# Patient Record
Sex: MALE | Race: WHITE | Employment: FULL TIME | URBAN - METROPOLITAN AREA
[De-identification: names, ages, dates, MRNs, and addresses within clinical notes are randomized per-mention and may not be internally consistent; named-entity substitution may affect disease eponyms.]

---

## 2017-01-16 ENCOUNTER — HOSPITAL ENCOUNTER (OUTPATIENT)
Dept: PREADMISSION TESTING | Age: 62
Discharge: HOME | End: 2017-01-16
Payer: COMMERCIAL

## 2017-01-16 VITALS
DIASTOLIC BLOOD PRESSURE: 92 MMHG | SYSTOLIC BLOOD PRESSURE: 149 MMHG | TEMPERATURE: 98.5 F | WEIGHT: 190 LBS | HEART RATE: 64 BPM | BODY MASS INDEX: 28.14 KG/M2 | HEIGHT: 69 IN

## 2017-01-16 LAB
ABO + RH BLD: NORMAL
APPEARANCE UR: CLEAR
ATRIAL RATE: 56 BPM
BACTERIA URNS QL MICRO: NEGATIVE /HPF
BILIRUB UR QL: NEGATIVE
BLOOD GROUP ANTIBODIES SERPL: NORMAL
CALCULATED P AXIS, ECG09: -11 DEGREES
CALCULATED R AXIS, ECG10: 64 DEGREES
CALCULATED T AXIS, ECG11: 11 DEGREES
COLOR UR: NORMAL
DIAGNOSIS, 93000: NORMAL
EPITH CASTS URNS QL MICRO: NORMAL /LPF
EST. AVERAGE GLUCOSE BLD GHB EST-MCNC: 128 MG/DL
GLUCOSE UR STRIP.AUTO-MCNC: NEGATIVE MG/DL
HBA1C MFR BLD: 6.1 % (ref 4.2–6.3)
HGB UR QL STRIP: NEGATIVE
HYALINE CASTS URNS QL MICRO: NORMAL /LPF (ref 0–5)
INR PPP: 1 (ref 0.9–1.1)
KETONES UR QL STRIP.AUTO: NEGATIVE MG/DL
LEUKOCYTE ESTERASE UR QL STRIP.AUTO: NEGATIVE
NITRITE UR QL STRIP.AUTO: NEGATIVE
P-R INTERVAL, ECG05: 150 MS
PH UR STRIP: 6 [PH] (ref 5–8)
PROT UR STRIP-MCNC: NEGATIVE MG/DL
PROTHROMBIN TIME: 9.9 SEC (ref 9–11.1)
Q-T INTERVAL, ECG07: 418 MS
QRS DURATION, ECG06: 102 MS
QTC CALCULATION (BEZET), ECG08: 403 MS
RBC #/AREA URNS HPF: NORMAL /HPF (ref 0–5)
SP GR UR REFRACTOMETRY: 1.02 (ref 1–1.03)
SPECIMEN EXP DATE BLD: NORMAL
UA: UC IF INDICATED,UAUC: NORMAL
UROBILINOGEN UR QL STRIP.AUTO: 0.2 EU/DL (ref 0.2–1)
VENTRICULAR RATE, ECG03: 56 BPM
WBC URNS QL MICRO: NORMAL /HPF (ref 0–4)

## 2017-01-16 PROCEDURE — 86900 BLOOD TYPING SEROLOGIC ABO: CPT | Performed by: ORTHOPAEDIC SURGERY

## 2017-01-16 PROCEDURE — 81001 URINALYSIS AUTO W/SCOPE: CPT | Performed by: ORTHOPAEDIC SURGERY

## 2017-01-16 PROCEDURE — 85610 PROTHROMBIN TIME: CPT | Performed by: ORTHOPAEDIC SURGERY

## 2017-01-16 PROCEDURE — 36415 COLL VENOUS BLD VENIPUNCTURE: CPT | Performed by: ORTHOPAEDIC SURGERY

## 2017-01-16 PROCEDURE — 83036 HEMOGLOBIN GLYCOSYLATED A1C: CPT | Performed by: ORTHOPAEDIC SURGERY

## 2017-01-16 PROCEDURE — 93005 ELECTROCARDIOGRAM TRACING: CPT

## 2017-01-16 RX ORDER — BISMUTH SUBSALICYLATE 262 MG
1 TABLET,CHEWABLE ORAL DAILY
COMMUNITY

## 2017-01-16 RX ORDER — IBUPROFEN 200 MG
TABLET ORAL
COMMUNITY

## 2017-01-16 RX ORDER — ATORVASTATIN CALCIUM 20 MG/1
TABLET, FILM COATED ORAL
COMMUNITY

## 2017-01-16 NOTE — PERIOP NOTES
PREOPERATIVE INSTRUCTIONS REVIEWED WITH PATIENT. PATIENT GIVEN TWO--SIX PACKS OF CHG WIPES. INSTRUCTIONS REVIEWED ON USE OF CHG WIPES. PATIENT GIVEN SSI INFECTIONS SHEET; MRSA/MSSA TREATMENT INSTRUCTION SHEET GIVEN WITH AN EXPLANATION TO PATIENT THAT THEY WILL BE NOTIFIED IF TREATMENT INSTRUCTIONS NEED TO BE INITIATED. PATIENT WAS GIVEN THE OPPORTUNITY TO ASK QUESTIONS; REGARDING THE INFORMATION PROVIDED. PREOP DIET AND NUTRITION UPDATED GUIDELINES/ INSTRUCTIONS REVIEWED WITH PATIENT.   PATIENT GIVEN INSTRUCTIONS AND DEMONSTRATED UNDERSTANDING OF INCENTIVE SPIROMETRY USE.

## 2017-01-17 LAB
BACTERIA SPEC CULT: NORMAL
BACTERIA SPEC CULT: NORMAL
SERVICE CMNT-IMP: NORMAL

## 2017-02-05 ENCOUNTER — ANESTHESIA EVENT (OUTPATIENT)
Dept: SURGERY | Age: 62
DRG: 462 | End: 2017-02-05
Payer: COMMERCIAL

## 2017-02-05 PROBLEM — M17.9 DJD (DEGENERATIVE JOINT DISEASE) OF KNEE: Status: ACTIVE | Noted: 2017-02-05

## 2017-02-06 ENCOUNTER — ANESTHESIA (OUTPATIENT)
Dept: SURGERY | Age: 62
DRG: 462 | End: 2017-02-06
Payer: COMMERCIAL

## 2017-02-06 ENCOUNTER — HOSPITAL ENCOUNTER (INPATIENT)
Age: 62
LOS: 1 days | Discharge: HOME HEALTH | DRG: 462 | End: 2017-02-07
Attending: ORTHOPAEDIC SURGERY | Admitting: ORTHOPAEDIC SURGERY
Payer: COMMERCIAL

## 2017-02-06 LAB
ABO + RH BLD: NORMAL
BLOOD GROUP ANTIBODIES SERPL: NORMAL
GLUCOSE BLD STRIP.AUTO-MCNC: 97 MG/DL (ref 65–100)
SERVICE CMNT-IMP: NORMAL
SPECIMEN EXP DATE BLD: NORMAL

## 2017-02-06 PROCEDURE — 77030020788: Performed by: ORTHOPAEDIC SURGERY

## 2017-02-06 PROCEDURE — 77030006784 HC BLD SAW OSC MCRA -B: Performed by: ORTHOPAEDIC SURGERY

## 2017-02-06 PROCEDURE — 77030028224 HC PDNG CST BSNM -A: Performed by: ORTHOPAEDIC SURGERY

## 2017-02-06 PROCEDURE — 0SRC0L9 REPLACEMENT OF RIGHT KNEE JOINT WITH MEDIAL UNICONDYLAR SYNTHETIC SUBSTITUTE, CEMENTED, OPEN APPROACH: ICD-10-PCS | Performed by: ORTHOPAEDIC SURGERY

## 2017-02-06 PROCEDURE — C1776 JOINT DEVICE (IMPLANTABLE): HCPCS | Performed by: ORTHOPAEDIC SURGERY

## 2017-02-06 PROCEDURE — 77030006813 HC BLD SAW RECIP STRY -C: Performed by: ORTHOPAEDIC SURGERY

## 2017-02-06 PROCEDURE — 74011250636 HC RX REV CODE- 250/636: Performed by: PHYSICIAN ASSISTANT

## 2017-02-06 PROCEDURE — 77030034850: Performed by: ORTHOPAEDIC SURGERY

## 2017-02-06 PROCEDURE — 76060000038 HC ANESTHESIA 3.5 TO 4 HR: Performed by: ORTHOPAEDIC SURGERY

## 2017-02-06 PROCEDURE — 77030006822 HC BLD SAW SAG BRSM -B: Performed by: ORTHOPAEDIC SURGERY

## 2017-02-06 PROCEDURE — 82962 GLUCOSE BLOOD TEST: CPT

## 2017-02-06 PROCEDURE — 77030026763 HC BN CEM J&J -E: Performed by: ORTHOPAEDIC SURGERY

## 2017-02-06 PROCEDURE — 74011250636 HC RX REV CODE- 250/636

## 2017-02-06 PROCEDURE — 77030018822 HC SLV COMPR FT COVD -B

## 2017-02-06 PROCEDURE — 77030018836 HC SOL IRR NACL ICUM -A: Performed by: ORTHOPAEDIC SURGERY

## 2017-02-06 PROCEDURE — 77030010783 HC BOWL MX BN CEM J&J -B: Performed by: ORTHOPAEDIC SURGERY

## 2017-02-06 PROCEDURE — 65270000029 HC RM PRIVATE

## 2017-02-06 PROCEDURE — 74011000250 HC RX REV CODE- 250

## 2017-02-06 PROCEDURE — 74011250636 HC RX REV CODE- 250/636: Performed by: ORTHOPAEDIC SURGERY

## 2017-02-06 PROCEDURE — 76210000017 HC OR PH I REC 1.5 TO 2 HR: Performed by: ORTHOPAEDIC SURGERY

## 2017-02-06 PROCEDURE — 36415 COLL VENOUS BLD VENIPUNCTURE: CPT | Performed by: ORTHOPAEDIC SURGERY

## 2017-02-06 PROCEDURE — 77030002933 HC SUT MCRYL J&J -A: Performed by: ORTHOPAEDIC SURGERY

## 2017-02-06 PROCEDURE — 74011250637 HC RX REV CODE- 250/637: Performed by: ANESTHESIOLOGY

## 2017-02-06 PROCEDURE — 74011000258 HC RX REV CODE- 258

## 2017-02-06 PROCEDURE — 74011000250 HC RX REV CODE- 250: Performed by: PHYSICIAN ASSISTANT

## 2017-02-06 PROCEDURE — 77030031139 HC SUT VCRL2 J&J -A: Performed by: ORTHOPAEDIC SURGERY

## 2017-02-06 PROCEDURE — 97161 PT EVAL LOW COMPLEX 20 MIN: CPT

## 2017-02-06 PROCEDURE — 74011000250 HC RX REV CODE- 250: Performed by: ORTHOPAEDIC SURGERY

## 2017-02-06 PROCEDURE — G8978 MOBILITY CURRENT STATUS: HCPCS

## 2017-02-06 PROCEDURE — 77030010788: Performed by: ORTHOPAEDIC SURGERY

## 2017-02-06 PROCEDURE — 74011250636 HC RX REV CODE- 250/636: Performed by: ANESTHESIOLOGY

## 2017-02-06 PROCEDURE — 74011000258 HC RX REV CODE- 258: Performed by: PHYSICIAN ASSISTANT

## 2017-02-06 PROCEDURE — G8979 MOBILITY GOAL STATUS: HCPCS

## 2017-02-06 PROCEDURE — 77030018673: Performed by: ORTHOPAEDIC SURGERY

## 2017-02-06 PROCEDURE — 86900 BLOOD TYPING SEROLOGIC ABO: CPT | Performed by: ORTHOPAEDIC SURGERY

## 2017-02-06 PROCEDURE — 74011250637 HC RX REV CODE- 250/637: Performed by: PHYSICIAN ASSISTANT

## 2017-02-06 PROCEDURE — 0SRD0L9 REPLACEMENT OF LEFT KNEE JOINT WITH MEDIAL UNICONDYLAR SYNTHETIC SUBSTITUTE, CEMENTED, OPEN APPROACH: ICD-10-PCS | Performed by: ORTHOPAEDIC SURGERY

## 2017-02-06 PROCEDURE — 77030011640 HC PAD GRND REM COVD -A: Performed by: ORTHOPAEDIC SURGERY

## 2017-02-06 PROCEDURE — 77030007866 HC KT SPN ANES BBMI -B: Performed by: ANESTHESIOLOGY

## 2017-02-06 PROCEDURE — 77030020753 HC CUF TRNQT 1BLA STRY -B: Performed by: ORTHOPAEDIC SURGERY

## 2017-02-06 PROCEDURE — 77030029372 HC ADH SKN CLSR PRINEO J&J -C: Performed by: ORTHOPAEDIC SURGERY

## 2017-02-06 PROCEDURE — C9290 INJ, BUPIVACAINE LIPOSOME: HCPCS | Performed by: ORTHOPAEDIC SURGERY

## 2017-02-06 PROCEDURE — 76010000134 HC OR TIME 3.5 TO 4 HR: Performed by: ORTHOPAEDIC SURGERY

## 2017-02-06 PROCEDURE — C9290 INJ, BUPIVACAINE LIPOSOME: HCPCS | Performed by: PHYSICIAN ASSISTANT

## 2017-02-06 PROCEDURE — 97116 GAIT TRAINING THERAPY: CPT

## 2017-02-06 PROCEDURE — 77030006783 HC BLD SAW OSC MCRA -A: Performed by: ORTHOPAEDIC SURGERY

## 2017-02-06 DEVICE — FEMORAL SIZE E LT MEDIAL/RT LATERAL
Type: IMPLANTABLE DEVICE | Site: KNEE | Status: FUNCTIONAL
Brand: ZUK

## 2017-02-06 DEVICE — FEMORAL SIZE E RT MEDIAL/LT LATERAL
Type: IMPLANTABLE DEVICE | Site: KNEE | Status: FUNCTIONAL
Brand: ZUK

## 2017-02-06 DEVICE — ARTICULAR SURFACE SIZE 5 10MM
Type: IMPLANTABLE DEVICE | Site: KNEE | Status: FUNCTIONAL
Brand: ZUK

## 2017-02-06 DEVICE — CEMENT BNE 40GM FULL DOSE PMMA W/ GENT HI VISC RADPQ LNG: Type: IMPLANTABLE DEVICE | Site: KNEE | Status: FUNCTIONAL

## 2017-02-06 DEVICE — KNEE CEM FLX FEM/TIB UNIFLX -- K1 BSV-KY-SC K2-NY: Type: IMPLANTABLE DEVICE | Status: FUNCTIONAL

## 2017-02-06 DEVICE — TIBIA BASE SIZE 4 LEFT                                    MEDIAL/RIGHT LATERAL
Type: IMPLANTABLE DEVICE | Site: KNEE | Status: FUNCTIONAL
Brand: ZUK

## 2017-02-06 DEVICE — TIBIA BASE SIZE 5 RIGHT                                    MEDIAL/LEFT LATERAL
Type: IMPLANTABLE DEVICE | Site: KNEE | Status: FUNCTIONAL
Brand: ZUK

## 2017-02-06 DEVICE — ARTICULAR SURFACE SIZE 4 9MM
Type: IMPLANTABLE DEVICE | Site: KNEE | Status: FUNCTIONAL
Brand: ZUK

## 2017-02-06 RX ORDER — NALOXONE HYDROCHLORIDE 0.4 MG/ML
0.4 INJECTION, SOLUTION INTRAMUSCULAR; INTRAVENOUS; SUBCUTANEOUS AS NEEDED
Status: DISCONTINUED | OUTPATIENT
Start: 2017-02-06 | End: 2017-02-07 | Stop reason: HOSPADM

## 2017-02-06 RX ORDER — FENTANYL CITRATE 50 UG/ML
50 INJECTION, SOLUTION INTRAMUSCULAR; INTRAVENOUS AS NEEDED
Status: DISCONTINUED | OUTPATIENT
Start: 2017-02-06 | End: 2017-02-06 | Stop reason: HOSPADM

## 2017-02-06 RX ORDER — BUPIVACAINE HYDROCHLORIDE 7.5 MG/ML
INJECTION, SOLUTION EPIDURAL; RETROBULBAR AS NEEDED
Status: DISCONTINUED | OUTPATIENT
Start: 2017-02-06 | End: 2017-02-06 | Stop reason: HOSPADM

## 2017-02-06 RX ORDER — ACETAMINOPHEN 325 MG/1
650 TABLET ORAL EVERY 6 HOURS
Status: DISCONTINUED | OUTPATIENT
Start: 2017-02-06 | End: 2017-02-07 | Stop reason: HOSPADM

## 2017-02-06 RX ORDER — SODIUM CHLORIDE 0.9 % (FLUSH) 0.9 %
5-10 SYRINGE (ML) INJECTION AS NEEDED
Status: DISCONTINUED | OUTPATIENT
Start: 2017-02-06 | End: 2017-02-06 | Stop reason: SDUPTHER

## 2017-02-06 RX ORDER — DEXAMETHASONE SODIUM PHOSPHATE 100 MG/10ML
4-8 INJECTION INTRAMUSCULAR; INTRAVENOUS ONCE
Status: COMPLETED | OUTPATIENT
Start: 2017-02-06 | End: 2017-02-06

## 2017-02-06 RX ORDER — PROPOFOL 10 MG/ML
INJECTION, EMULSION INTRAVENOUS AS NEEDED
Status: DISCONTINUED | OUTPATIENT
Start: 2017-02-06 | End: 2017-02-06 | Stop reason: HOSPADM

## 2017-02-06 RX ORDER — MORPHINE SULFATE 2 MG/ML
2 INJECTION, SOLUTION INTRAMUSCULAR; INTRAVENOUS
Status: DISCONTINUED | OUTPATIENT
Start: 2017-02-06 | End: 2017-02-07 | Stop reason: HOSPADM

## 2017-02-06 RX ORDER — ATORVASTATIN CALCIUM 20 MG/1
20 TABLET, FILM COATED ORAL
Status: DISCONTINUED | OUTPATIENT
Start: 2017-02-06 | End: 2017-02-07 | Stop reason: HOSPADM

## 2017-02-06 RX ORDER — MIDAZOLAM HYDROCHLORIDE 1 MG/ML
1 INJECTION, SOLUTION INTRAMUSCULAR; INTRAVENOUS AS NEEDED
Status: DISCONTINUED | OUTPATIENT
Start: 2017-02-06 | End: 2017-02-06 | Stop reason: HOSPADM

## 2017-02-06 RX ORDER — KETOROLAC TROMETHAMINE 30 MG/ML
30 INJECTION, SOLUTION INTRAMUSCULAR; INTRAVENOUS EVERY 6 HOURS
Status: DISCONTINUED | OUTPATIENT
Start: 2017-02-06 | End: 2017-02-07 | Stop reason: HOSPADM

## 2017-02-06 RX ORDER — HYDROMORPHONE HYDROCHLORIDE 1 MG/ML
0.5 INJECTION, SOLUTION INTRAMUSCULAR; INTRAVENOUS; SUBCUTANEOUS
Status: DISCONTINUED | OUTPATIENT
Start: 2017-02-06 | End: 2017-02-06 | Stop reason: SDUPTHER

## 2017-02-06 RX ORDER — POLYETHYLENE GLYCOL 3350 17 G/17G
17 POWDER, FOR SOLUTION ORAL DAILY
Status: DISCONTINUED | OUTPATIENT
Start: 2017-02-07 | End: 2017-02-07 | Stop reason: HOSPADM

## 2017-02-06 RX ORDER — ROPIVACAINE HYDROCHLORIDE 5 MG/ML
150 INJECTION, SOLUTION EPIDURAL; INFILTRATION; PERINEURAL AS NEEDED
Status: DISCONTINUED | OUTPATIENT
Start: 2017-02-06 | End: 2017-02-06 | Stop reason: HOSPADM

## 2017-02-06 RX ORDER — HYDROMORPHONE HYDROCHLORIDE 1 MG/ML
0.2 INJECTION, SOLUTION INTRAMUSCULAR; INTRAVENOUS; SUBCUTANEOUS
Status: DISCONTINUED | OUTPATIENT
Start: 2017-02-06 | End: 2017-02-06 | Stop reason: HOSPADM

## 2017-02-06 RX ORDER — SODIUM CHLORIDE 9 MG/ML
25 INJECTION, SOLUTION INTRAVENOUS CONTINUOUS
Status: DISCONTINUED | OUTPATIENT
Start: 2017-02-06 | End: 2017-02-06 | Stop reason: HOSPADM

## 2017-02-06 RX ORDER — DIPHENHYDRAMINE HYDROCHLORIDE 50 MG/ML
12.5 INJECTION, SOLUTION INTRAMUSCULAR; INTRAVENOUS AS NEEDED
Status: DISCONTINUED | OUTPATIENT
Start: 2017-02-06 | End: 2017-02-06 | Stop reason: HOSPADM

## 2017-02-06 RX ORDER — SODIUM CHLORIDE 0.9 % (FLUSH) 0.9 %
5-10 SYRINGE (ML) INJECTION AS NEEDED
Status: DISCONTINUED | OUTPATIENT
Start: 2017-02-06 | End: 2017-02-06 | Stop reason: HOSPADM

## 2017-02-06 RX ORDER — MIDAZOLAM HYDROCHLORIDE 1 MG/ML
INJECTION, SOLUTION INTRAMUSCULAR; INTRAVENOUS AS NEEDED
Status: DISCONTINUED | OUTPATIENT
Start: 2017-02-06 | End: 2017-02-06 | Stop reason: HOSPADM

## 2017-02-06 RX ORDER — NALOXONE HYDROCHLORIDE 0.4 MG/ML
0.2 INJECTION, SOLUTION INTRAMUSCULAR; INTRAVENOUS; SUBCUTANEOUS AS NEEDED
Status: DISCONTINUED | OUTPATIENT
Start: 2017-02-06 | End: 2017-02-07 | Stop reason: HOSPADM

## 2017-02-06 RX ORDER — FENTANYL CITRATE 50 UG/ML
25 INJECTION, SOLUTION INTRAMUSCULAR; INTRAVENOUS
Status: DISCONTINUED | OUTPATIENT
Start: 2017-02-06 | End: 2017-02-06 | Stop reason: HOSPADM

## 2017-02-06 RX ORDER — MORPHINE SULFATE 10 MG/ML
2 INJECTION, SOLUTION INTRAMUSCULAR; INTRAVENOUS
Status: DISCONTINUED | OUTPATIENT
Start: 2017-02-06 | End: 2017-02-06 | Stop reason: HOSPADM

## 2017-02-06 RX ORDER — OXYCODONE HYDROCHLORIDE 5 MG/1
5 TABLET ORAL
Status: DISCONTINUED | OUTPATIENT
Start: 2017-02-06 | End: 2017-02-07 | Stop reason: HOSPADM

## 2017-02-06 RX ORDER — ONDANSETRON 2 MG/ML
INJECTION INTRAMUSCULAR; INTRAVENOUS AS NEEDED
Status: DISCONTINUED | OUTPATIENT
Start: 2017-02-06 | End: 2017-02-06 | Stop reason: HOSPADM

## 2017-02-06 RX ORDER — SODIUM CHLORIDE 0.9 % (FLUSH) 0.9 %
5-10 SYRINGE (ML) INJECTION EVERY 8 HOURS
Status: DISCONTINUED | OUTPATIENT
Start: 2017-02-06 | End: 2017-02-06 | Stop reason: SDUPTHER

## 2017-02-06 RX ORDER — CEFAZOLIN SODIUM IN 0.9 % NACL 2 G/50 ML
2 INTRAVENOUS SOLUTION, PIGGYBACK (ML) INTRAVENOUS EVERY 8 HOURS
Status: COMPLETED | OUTPATIENT
Start: 2017-02-06 | End: 2017-02-07

## 2017-02-06 RX ORDER — CEFAZOLIN SODIUM IN 0.9 % NACL 2 G/50 ML
2 INTRAVENOUS SOLUTION, PIGGYBACK (ML) INTRAVENOUS ONCE
Status: COMPLETED | OUTPATIENT
Start: 2017-02-06 | End: 2017-02-06

## 2017-02-06 RX ORDER — SODIUM CHLORIDE 0.9 % (FLUSH) 0.9 %
5-10 SYRINGE (ML) INJECTION AS NEEDED
Status: DISCONTINUED | OUTPATIENT
Start: 2017-02-06 | End: 2017-02-07 | Stop reason: HOSPADM

## 2017-02-06 RX ORDER — PREGABALIN 75 MG/1
75 CAPSULE ORAL ONCE
Status: COMPLETED | OUTPATIENT
Start: 2017-02-06 | End: 2017-02-06

## 2017-02-06 RX ORDER — DEXAMETHASONE SODIUM PHOSPHATE 4 MG/ML
INJECTION, SOLUTION INTRA-ARTICULAR; INTRALESIONAL; INTRAMUSCULAR; INTRAVENOUS; SOFT TISSUE AS NEEDED
Status: DISCONTINUED | OUTPATIENT
Start: 2017-02-06 | End: 2017-02-06 | Stop reason: HOSPADM

## 2017-02-06 RX ORDER — PHENYLEPHRINE HCL IN 0.9% NACL 0.4MG/10ML
SYRINGE (ML) INTRAVENOUS AS NEEDED
Status: DISCONTINUED | OUTPATIENT
Start: 2017-02-06 | End: 2017-02-06 | Stop reason: HOSPADM

## 2017-02-06 RX ORDER — ONDANSETRON 2 MG/ML
4 INJECTION INTRAMUSCULAR; INTRAVENOUS AS NEEDED
Status: DISCONTINUED | OUTPATIENT
Start: 2017-02-06 | End: 2017-02-06 | Stop reason: HOSPADM

## 2017-02-06 RX ORDER — ONDANSETRON 2 MG/ML
4 INJECTION INTRAMUSCULAR; INTRAVENOUS
Status: DISCONTINUED | OUTPATIENT
Start: 2017-02-06 | End: 2017-02-07 | Stop reason: HOSPADM

## 2017-02-06 RX ORDER — SODIUM CHLORIDE 0.9 % (FLUSH) 0.9 %
5-10 SYRINGE (ML) INJECTION EVERY 8 HOURS
Status: DISCONTINUED | OUTPATIENT
Start: 2017-02-07 | End: 2017-02-07 | Stop reason: HOSPADM

## 2017-02-06 RX ORDER — FENTANYL CITRATE 50 UG/ML
INJECTION, SOLUTION INTRAMUSCULAR; INTRAVENOUS AS NEEDED
Status: DISCONTINUED | OUTPATIENT
Start: 2017-02-06 | End: 2017-02-06 | Stop reason: HOSPADM

## 2017-02-06 RX ORDER — ACETAMINOPHEN 325 MG/1
650 TABLET ORAL
Status: DISCONTINUED | OUTPATIENT
Start: 2017-02-06 | End: 2017-02-07 | Stop reason: HOSPADM

## 2017-02-06 RX ORDER — OXYCODONE HYDROCHLORIDE 5 MG/1
5 TABLET ORAL AS NEEDED
Status: DISCONTINUED | OUTPATIENT
Start: 2017-02-06 | End: 2017-02-06 | Stop reason: HOSPADM

## 2017-02-06 RX ORDER — CELECOXIB 200 MG/1
200 CAPSULE ORAL ONCE
Status: COMPLETED | OUTPATIENT
Start: 2017-02-06 | End: 2017-02-06

## 2017-02-06 RX ORDER — PROPOFOL 10 MG/ML
INJECTION, EMULSION INTRAVENOUS
Status: DISCONTINUED | OUTPATIENT
Start: 2017-02-06 | End: 2017-02-06

## 2017-02-06 RX ORDER — FACIAL-BODY WIPES
10 EACH TOPICAL DAILY PRN
Status: DISCONTINUED | OUTPATIENT
Start: 2017-02-08 | End: 2017-02-07 | Stop reason: HOSPADM

## 2017-02-06 RX ORDER — HYDROXYZINE HYDROCHLORIDE 10 MG/1
10 TABLET, FILM COATED ORAL
Status: DISCONTINUED | OUTPATIENT
Start: 2017-02-06 | End: 2017-02-07 | Stop reason: HOSPADM

## 2017-02-06 RX ORDER — ASPIRIN 325 MG
325 TABLET, DELAYED RELEASE (ENTERIC COATED) ORAL 2 TIMES DAILY
Status: DISCONTINUED | OUTPATIENT
Start: 2017-02-06 | End: 2017-02-07 | Stop reason: HOSPADM

## 2017-02-06 RX ORDER — HYDROMORPHONE HYDROCHLORIDE 1 MG/ML
0.5 INJECTION, SOLUTION INTRAMUSCULAR; INTRAVENOUS; SUBCUTANEOUS
Status: DISCONTINUED | OUTPATIENT
Start: 2017-02-07 | End: 2017-02-07 | Stop reason: HOSPADM

## 2017-02-06 RX ORDER — OXYCODONE HYDROCHLORIDE 5 MG/1
10 TABLET ORAL
Status: DISCONTINUED | OUTPATIENT
Start: 2017-02-06 | End: 2017-02-07 | Stop reason: HOSPADM

## 2017-02-06 RX ORDER — SODIUM CHLORIDE, SODIUM LACTATE, POTASSIUM CHLORIDE, CALCIUM CHLORIDE 600; 310; 30; 20 MG/100ML; MG/100ML; MG/100ML; MG/100ML
100 INJECTION, SOLUTION INTRAVENOUS CONTINUOUS
Status: DISCONTINUED | OUTPATIENT
Start: 2017-02-06 | End: 2017-02-06 | Stop reason: HOSPADM

## 2017-02-06 RX ORDER — SODIUM CHLORIDE 9 MG/ML
125 INJECTION, SOLUTION INTRAVENOUS CONTINUOUS
Status: DISCONTINUED | OUTPATIENT
Start: 2017-02-06 | End: 2017-02-07 | Stop reason: HOSPADM

## 2017-02-06 RX ORDER — SODIUM CHLORIDE 0.9 % (FLUSH) 0.9 %
5-10 SYRINGE (ML) INJECTION EVERY 8 HOURS
Status: DISCONTINUED | OUTPATIENT
Start: 2017-02-06 | End: 2017-02-06 | Stop reason: HOSPADM

## 2017-02-06 RX ORDER — ONDANSETRON 2 MG/ML
4 INJECTION INTRAMUSCULAR; INTRAVENOUS
Status: DISCONTINUED | OUTPATIENT
Start: 2017-02-06 | End: 2017-02-06 | Stop reason: SDUPTHER

## 2017-02-06 RX ORDER — MIDAZOLAM HYDROCHLORIDE 1 MG/ML
0.5 INJECTION, SOLUTION INTRAMUSCULAR; INTRAVENOUS
Status: DISCONTINUED | OUTPATIENT
Start: 2017-02-06 | End: 2017-02-06 | Stop reason: HOSPADM

## 2017-02-06 RX ORDER — DEXAMETHASONE SODIUM PHOSPHATE 10 MG/ML
10 INJECTION INTRAMUSCULAR; INTRAVENOUS ONCE
Status: COMPLETED | OUTPATIENT
Start: 2017-02-07 | End: 2017-02-07

## 2017-02-06 RX ORDER — AMOXICILLIN 250 MG
1 CAPSULE ORAL 2 TIMES DAILY
Status: DISCONTINUED | OUTPATIENT
Start: 2017-02-06 | End: 2017-02-07 | Stop reason: HOSPADM

## 2017-02-06 RX ORDER — PROPOFOL 10 MG/ML
INJECTION, EMULSION INTRAVENOUS
Status: DISCONTINUED | OUTPATIENT
Start: 2017-02-06 | End: 2017-02-06 | Stop reason: HOSPADM

## 2017-02-06 RX ORDER — LIDOCAINE HYDROCHLORIDE 10 MG/ML
0.1 INJECTION, SOLUTION EPIDURAL; INFILTRATION; INTRACAUDAL; PERINEURAL AS NEEDED
Status: DISCONTINUED | OUTPATIENT
Start: 2017-02-06 | End: 2017-02-06 | Stop reason: HOSPADM

## 2017-02-06 RX ORDER — SODIUM CHLORIDE, SODIUM LACTATE, POTASSIUM CHLORIDE, CALCIUM CHLORIDE 600; 310; 30; 20 MG/100ML; MG/100ML; MG/100ML; MG/100ML
1000 INJECTION, SOLUTION INTRAVENOUS CONTINUOUS
Status: DISCONTINUED | OUTPATIENT
Start: 2017-02-06 | End: 2017-02-06 | Stop reason: HOSPADM

## 2017-02-06 RX ORDER — MORPHINE SULFATE 0.5 MG/ML
INJECTION, SOLUTION EPIDURAL; INTRATHECAL; INTRAVENOUS AS NEEDED
Status: DISCONTINUED | OUTPATIENT
Start: 2017-02-06 | End: 2017-02-06 | Stop reason: HOSPADM

## 2017-02-06 RX ORDER — DIPHENHYDRAMINE HCL 12.5MG/5ML
12.5 ELIXIR ORAL
Status: DISCONTINUED | OUTPATIENT
Start: 2017-02-06 | End: 2017-02-07 | Stop reason: HOSPADM

## 2017-02-06 RX ORDER — SODIUM CHLORIDE, SODIUM LACTATE, POTASSIUM CHLORIDE, CALCIUM CHLORIDE 600; 310; 30; 20 MG/100ML; MG/100ML; MG/100ML; MG/100ML
INJECTION, SOLUTION INTRAVENOUS
Status: DISCONTINUED | OUTPATIENT
Start: 2017-02-06 | End: 2017-02-06 | Stop reason: HOSPADM

## 2017-02-06 RX ADMIN — MORPHINE SULFATE 200 MCG: 0.5 INJECTION, SOLUTION EPIDURAL; INTRATHECAL; INTRAVENOUS at 10:43

## 2017-02-06 RX ADMIN — DOCUSATE SODIUM AND SENNOSIDES 1 TABLET: 8.6; 5 TABLET, FILM COATED ORAL at 17:03

## 2017-02-06 RX ADMIN — KETOROLAC TROMETHAMINE 30 MG: 30 INJECTION, SOLUTION INTRAMUSCULAR at 19:21

## 2017-02-06 RX ADMIN — ACETAMINOPHEN 650 MG: 325 TABLET, FILM COATED ORAL at 21:14

## 2017-02-06 RX ADMIN — PROPOFOL 20 MG: 10 INJECTION, EMULSION INTRAVENOUS at 10:40

## 2017-02-06 RX ADMIN — PROPOFOL 80 MCG/KG/MIN: 10 INJECTION, EMULSION INTRAVENOUS at 10:45

## 2017-02-06 RX ADMIN — PROPOFOL 30 MG: 10 INJECTION, EMULSION INTRAVENOUS at 10:36

## 2017-02-06 RX ADMIN — ONDANSETRON 4 MG: 2 INJECTION INTRAMUSCULAR; INTRAVENOUS at 10:30

## 2017-02-06 RX ADMIN — SODIUM CHLORIDE, SODIUM LACTATE, POTASSIUM CHLORIDE, CALCIUM CHLORIDE: 600; 310; 30; 20 INJECTION, SOLUTION INTRAVENOUS at 10:20

## 2017-02-06 RX ADMIN — CEFAZOLIN 2 G: 1 INJECTION, POWDER, FOR SOLUTION INTRAMUSCULAR; INTRAVENOUS; PARENTERAL at 18:01

## 2017-02-06 RX ADMIN — SODIUM CHLORIDE 125 ML/HR: 900 INJECTION, SOLUTION INTRAVENOUS at 14:50

## 2017-02-06 RX ADMIN — DEXAMETHASONE SODIUM PHOSPHATE 4 MG: 10 INJECTION INTRAMUSCULAR; INTRAVENOUS at 10:30

## 2017-02-06 RX ADMIN — MIDAZOLAM HYDROCHLORIDE 1 MG: 1 INJECTION, SOLUTION INTRAMUSCULAR; INTRAVENOUS at 11:30

## 2017-02-06 RX ADMIN — CEFAZOLIN 2 G: 1 INJECTION, POWDER, FOR SOLUTION INTRAMUSCULAR; INTRAVENOUS; PARENTERAL at 10:34

## 2017-02-06 RX ADMIN — MORPHINE SULFATE 250 MCG: 0.5 INJECTION, SOLUTION EPIDURAL; INTRATHECAL; INTRAVENOUS at 10:42

## 2017-02-06 RX ADMIN — SODIUM CHLORIDE 125 ML/HR: 900 INJECTION, SOLUTION INTRAVENOUS at 23:48

## 2017-02-06 RX ADMIN — DEXAMETHASONE SODIUM PHOSPHATE 4 MG: 4 INJECTION, SOLUTION INTRA-ARTICULAR; INTRALESIONAL; INTRAMUSCULAR; INTRAVENOUS; SOFT TISSUE at 10:30

## 2017-02-06 RX ADMIN — PREGABALIN 75 MG: 75 CAPSULE ORAL at 10:08

## 2017-02-06 RX ADMIN — Medication 40 MCG: at 10:55

## 2017-02-06 RX ADMIN — Medication 40 MCG: at 10:45

## 2017-02-06 RX ADMIN — BUPIVACAINE HYDROCHLORIDE 1.5 ML: 7.5 INJECTION, SOLUTION EPIDURAL; RETROBULBAR at 10:43

## 2017-02-06 RX ADMIN — MIDAZOLAM HYDROCHLORIDE 1 MG: 1 INJECTION, SOLUTION INTRAMUSCULAR; INTRAVENOUS at 12:30

## 2017-02-06 RX ADMIN — FENTANYL CITRATE 50 MCG: 50 INJECTION, SOLUTION INTRAMUSCULAR; INTRAVENOUS at 10:30

## 2017-02-06 RX ADMIN — CELECOXIB 200 MG: 200 CAPSULE ORAL at 10:08

## 2017-02-06 RX ADMIN — ACETAMINOPHEN 650 MG: 325 TABLET, FILM COATED ORAL at 16:21

## 2017-02-06 RX ADMIN — ASPIRIN 325 MG: 325 TABLET, DELAYED RELEASE ORAL at 17:03

## 2017-02-06 RX ADMIN — MIDAZOLAM HYDROCHLORIDE 3 MG: 1 INJECTION, SOLUTION INTRAMUSCULAR; INTRAVENOUS at 10:30

## 2017-02-06 RX ADMIN — SODIUM CHLORIDE, SODIUM LACTATE, POTASSIUM CHLORIDE, AND CALCIUM CHLORIDE 1000 ML: 600; 310; 30; 20 INJECTION, SOLUTION INTRAVENOUS at 10:15

## 2017-02-06 NOTE — DISCHARGE INSTRUCTIONS
After Hospital Care Plan:  Discharge Instructions Knee Replacement  Dr. Tomasa Lowe    Patient Name: Nahed Tineo  Date of procedure: 2/6/2017   Procedure: Procedure(s):  BILATERAL UNICOMPARTMENTAL KNEE REPLACEMENT  Surgeon: Gualberto Trammell) and Role:     * Magy Kincaid MD - Primary  PCP: Aldo Sherman MD  Date of discharge: No discharge date for patient encounter. Follow up appointments   Follow up with Dr. Tomasa Lowe in 3 weeks. Call [phone] to make an appointment.  If home health has been arranged for you the agency will contact you to arrange dates/times for visits. Please call them if you do not hear from them within 24 hours after you are discharged    When to call your Orthopaedic Surgeon: Call [phone]. If you call after 5pm or on a weekend, the on call physician will be contacted   Unrelieved pain   Signs of infection-if your incision is red; continues to have drainage; drainage has a foul odor or if you have a persistent fever over 101 degrees   Signs of a blood clot in your leg-calf pain, tenderness, redness, swelling of lower leg    When to call your Primary Care Physician:   Concerns about medical conditions such as diabetes, high blood pressure, asthma, congestive heart failure   Call if blood sugars are elevated, persistent headache or dizziness, coughing or congestion, constipation or diarrhea, burning with urination, abnormal heart rate    When to call [id number] go to the nearest emergency room   Acute onset of chest pain, shortness of breath, difficulty breathing    Activity   Weight bearing as tolerated with walker or crutches.  Refer to pages 23-31 of your handbook for instructions and pictures   Complete your Home Exercise Program daily as instructed by your therapist.  Refer to pages 33-41 of your handbook for instructions and pictures   Get up every one hour and walk (except at night when sleeping)   Do not drive or operate heavy machinery    Incision Care     Your incision has been closed with absorbable sutures and the Dermabond  Prineo skin closure system which is a combination of a transparent mesh and a l iquid adhesive that will assist with healing.  The Dermabond Prineo mesh is to remain over your incision for 3 weeks.  A dry dressing (ABD and paper tape) will be placed over it and should be  changed daily. Please make sure to wash your hands prior to touching your  dressing.  You may shower daily with the Dermabond Prineo mesh in place. After your  shower blot your incision dry with a soft towel and place a new dry dressing  (ABD and paper tape) over your incision. Do NOT allow the tape to come in  contact with the Prineo mesh.  If you experience drainage leaking from under the Prineo mesh or if it peels off  before 3 weeks, please contact my office.  The Prineo mesh will be removed during your first office follow up visit. You may  then shower and let your incision get wet but do not submerge your incision  under water in a bath tub, hot tub or swimming pool for 6 weeks after surgery. Preventing blood clots    Take one coated Aspirin twice a day for one month following surgery   Wear elastic stockings (TEDS) for 4 weeks. You may remove them for approximately 1 hour daily for showering/sponge bathing    Pain management   Take pain medication as prescribed; decrease the amount you use as your pain lessens   Avoid alcoholic beverages while taking pain medication   Do not take any over-the-counter medication for pain except Tylenol (acetaminophen)   Do not take more than 4 Grams of Tylenol in a 24 hour period. (There are 1000 milligrams in one Gram)  o Tylenol 325 mg tablets-do not take more than 12 tablets in a 24 hour period.   o Tylenol 500 mg tablets-do not take more than 8 tablets in a 24 hour period  o Tylenol 650 mg tablets-do not take more than 6 tablets in a 24 hour period   You may place an ice bag on your knee for 15-20 minutes after exercising and as needed throughout the day and night    Diet   Resume usual diet; drink plenty of fluids; eat foods high in fiber   You may want to take a stool softener (such as Senokot-S or Colace) to prevent constipation while you are taking pain medication. If constipation occurs, take a laxative (such as Dulcolax tablets, Milk of Magnesia, or a suppository)    [facility] Protocol (to be followed by [facility])    Nursing-per physicians order    Complete head to toe assessment, vital signs   Medication reconciliation   Review pain management   Manage chronic medical conditions    Physical Therapy-per physicians order    Weight bearing status:             Mobility Status:                Gait:                ADL status overall composite:                   Physical Therapy   Assessment and evaluation-bed mobility; functional transfers (bed, chair, bathroom, stairs); ambulation with equipment, car transfers, shower transfers, safety and ability to get out of house in the event of an emergency   Review and maintain weight bearing as tolerated   Discuss pain management   Review how to do ADLs.  Refer to page 42 of patient handbook    Home Exercise Program-refer to pages 33-41 of the patient handbook for exercises

## 2017-02-06 NOTE — ROUTINE PROCESS
Patient: Antonio Iniguez MRN: [id number]  SSN: [id number]   YOB: 1955  Age: 64 y.o. Sex: male     Patient is status post Procedure(s):  BILATERAL UNICOMPARTMENTAL KNEE REPLACEMENT.     Surgeon(s) and Role:     * Sheryle Mirza, MD - Primary    Local/Dose/Irrigation: Left knee injection: 0.25% Marcaine w/ epinephrine 1:200,000 30 ml, Exparel 266 mg, Toradol 30 mg, Morphine in 50 ml injectable saline  Right knee injection: 0.25% marcaine w/ epinephrine 1:200,000 30 ml, Exparel 266 mg, Toradol 30 mg, Morphine 10 mg in 50 ml injectable saline                  Peripheral IV 02/06/17 Left Hand (Active)                           Dressing/Packing:  Wound Knee Left-DRESSING TYPE: 4 x 4;ABD pad;Cast padding;Elastic bandage;Non-adherent (dermabond Prineo) (02/06/17 1100)  Wound Knee Right-DRESSING TYPE: 4 x 4;ABD pad;Cast padding;Elastic bandage;Non-adherent (Dermabond Prineo. ) (02/06/17 1100)  Splint/Cast:  ]    Other:

## 2017-02-06 NOTE — PROGRESS NOTES
TRANSFER - IN REPORT:    Verbal report received from [facility] (name) on [facility]  being received from PACU (unit) for routine post - op      Report consisted of patients Situation, Background, Assessment and   Recommendations(SBAR). Information from the following report(s) SBAR and Kardex was reviewed with the receiving nurse. Opportunity for questions and clarification was provided. Assessment completed upon patients arrival to unit and care assumed.

## 2017-02-06 NOTE — OP NOTES
Name: Shanti West  MRN:  [id number]  : 1955  Age:  64 y.o. Surgery Date: 2017      OPERATIVE REPORT   BILATERAL UNICOMPARTMENTAL KNEE REPLACEMENT    PREOPERATIVE DIAGNOSIS: Osteoarthritis medial compartment, both knees. POSTOPERATIVE DIAGNOSIS: Osteoarthritis medial compartment, both knees. PROCEDURE PERFORMED: Bilateral unicompartmental knee arthroplasty. SURGEON: Saurabh Zuniga MD    FIRST ASSISTANT:  Sandra Ledbetter. Ivonne Thorne PA-C    ANESTHESIA: Spinal    PRE-OP ANTIBIOTIC: Ancef 2g    COMPLICATIONS: None. ESTIMATED BLOOD LOSS: 50 mL. SPECIMENS REMOVED: None. COMPONENTS IMPLANTED:   Implant Name Type Inv. Item Serial No.  Lot No. LRB No. Used Action   CEMENT BNE GENTAMC GHV 40GM -- SMARTSET - SNA  CEMENT BNE GENTAMC GHV 40GM -- SMARTSET NA [facility] [employer] [id number] Left 1 Implanted   CEMENT BNE GENTAMC GHV 40GM -- SMARTSET - SNA  CEMENT BNE GENTAMC GHV 40GM -- SMARTSET NA [facility] [employer] [id number] Right 1 Implanted   FEM UNI PC HI-FLX LM/RL SZ-E -- ZUK - SNA  FEM UNI PC HI-FLX LM/RL SZ-E -- ZUK NA [employer] [id number] Left 1 Implanted   TRAY TIB UNI PC LM/RL SZ 4 -- ZUK - SNA  TRAY TIB UNI PC LM/RL SZ 4 -- ZUK NA [employer] [id number] Left 1 Implanted   INSERT TIB ARTC UNI KN SZ4 9MM -- ZUK - SNA  INSERT TIB ARTC UNI KN SZ4 9MM -- ZUK NA [employer] [id number] Left 1 Implanted   FEM UNI PC HI-FLX RM/LL SZ-E -- ZUK - [id number]  FEM UNI PC HI-FLX RM/LL SZ-E -- [facility] [employer] [id number] Right 1 Implanted   TRAY TIB UNI PC RM/LL 5 -- ZUK - [id number]  TRAY TIB UNI PC RM/LL 5 -- [facility]  [employer] [id number] Right 1 Implanted   INSERT TIB UNI 5 10MM -- ZUK - [id number]   INSERT TIB UNI 5 10MM -- [facility]   [employer] [id number] Right 1 Implanted       INDICATIONS: The patient is an 64 yrs male with progressive debilitating left knee pain due to severe osteoarthritis.  Symptoms have progressed despite comprehensive conservative treatment and the patient presents for left unicompartmental knee replacement. Risks, benefits, alternatives of the procedure were reviewed in detail and the patient desires to proceed. The patient understands the increased risk for perioperative medical complications. DESCRIPTION OF PROCEDURE: Anesthetic was initiated. Preoperative dose of antibiotic was given. Hamm catheter was placed. Left side was confirmed as the operative side, prepped and draped in the usual sterile fashion. Skin was covered with Ioban occlusive dressing. Medial parapatellar approach was made to the left knee. The knee was examined. Severe medial compartment disease. No issues on the lateral or patellofemoral joints. The knee was exposed. The extramedullary alignment guide was used to make a proximal tibial cut. The femur was cut parallel to the tibia with a spacer block technique. The femur was sized for and E. The cutting block was placed and provisionally pinned, examined the flexion gap, translated the femoral component as far lateral as possible and created a rectangular flexion gap with rotation. The femur was pinned and finished for an E, tibia was prepared for a 4. The meniscus was removed. Bony surfaces were drilled and the sclerotic areas lavaged and dried the bony surfaces and implanted a size 4 tibia, size E femur, and a 9 mm poly. Cement was allowed to fully cure with no motion and all excess cement was removed. Attention was then paid to the right knee. The right lower extremity was exsanguinated using esmarch. Medial parapatellar approach was made to the right knee. The knee was examined. Severe medial compartment disease. No issues on the lateral or patellofemoral joints. The knee was exposed. The extramedullary alignment guide was used to make a proximal tibial cut. The femur was cut parallel to the tibia with a spacer block technique.  The femur was sized for and E. The cutting block was placed and provisionally pinned, examined the flexion gap, translated the femoral component as far lateral as possible and created a rectangular flexion gap with rotation. The femur was pinned and finished for an E, tibia was prepared for a 5. The meniscus was removed. Bony surfaces were drilled and the sclerotic areas lavaged and dried the bony surfaces and implanted a size 5 tibia, size E femur, and a 10 mm poly. Cement was allowed to fully cure with no motion and all excess cement was removed. At this point, the knee was ranged, irrigated copiously with pulsatile lavage. Soft tissues were infiltrated with local anesthetic. The arthrotomy was closed with #2 Vicryl sutures and 0 Vicryl sutures. Deep wound was again irrigated. Skin and subcutaneous were closed in standard fashion. Sterile dressing was applied. There were no complications. No specimen was sent. Procedure was unicompartmental arthroplasty, right knee. Boo Alvares PA-C was of vital assistance during the procedure. The patient was taken to the recovery room in good condition.     Jacklyn Kyle PA-C

## 2017-02-06 NOTE — IP AVS SNAPSHOT
Current Discharge Medication List  
  
Take these medications at their scheduled times Dose & Instructions Dispensing Information Comments Morning Noon Evening Bedtime  
 aspirin delayed-release 325 mg tablet Your next dose is: Today, Tomorrow Other:  ____________ Dose:  325 mg Take 1 Tab by mouth two (2) times a day. Quantity:  60 Tab Refills:  0  
     
   
   
   
  
 atorvastatin 20 mg tablet Commonly known as:  LIPITOR Your next dose is: Today, Tomorrow Other:  ____________ Take  by mouth nightly. Refills:  0 Take these medications as needed Dose & Instructions Dispensing Information Comments Morning Noon Evening Bedtime  
 oxyCODONE IR 5 mg immediate release tablet Commonly known as:  Marilyn Dailey Your next dose is: Today, Tomorrow Other:  ____________ Dose:  5-10 mg Take 1-2 Tabs by mouth every four (4) hours as needed for Pain. Max Daily Amount: 60 mg.  
 Quantity:  70 Tab Refills:  0 Where to Get Your Medications Information about where to get these medications is not yet available ! Ask your nurse or doctor about these medications  
  aspirin delayed-release 325 mg tablet  
 oxyCODONE IR 5 mg immediate release tablet

## 2017-02-06 NOTE — H&P
[facility]  Location: [place] [address] [address]  Patient #: [id number]  : 1955   / Language: English / Race: White  Male      History of Present Illness  The patient is a 64year old male who presents to the practice today for a who presents to the practice today for a transition into care. Additional reasons for visit:    Knee Pain is described as the following: The onset of the knee pain has been gradual and has been occurring in a persistent pattern for 3 months. The course has been worsening. The knee pain is moderate to severe. The knee pain is described as being located in the entire knee (bilateral). The knee pain is aggravated by twisting, squatting and kneeling. Note for \"Knee Pain\": Patient's chief complaint is bilateral knee pain. Pain is along the medial joint lines. He's noticed significant decline in his activity level. He's been treated with previous arthroscopic surgery. He's had injections. He takes anti-inflammatory medications. He can no longer go for a long walk. He has pain along the medial joint lines. The injections are no longer helping him. Problem List/Past Medical   FOLLOW-UP AFTER SURGERY (V67.00)   Dietary counseling (V65.3)   REVIEW OF SYSTEMS: Systems were reviewed by the provider.   Left medial knee pain (719.46  M25.562)   Weight above 97th percentile (V49.89  Z78.9)     Allergies   No Known Drug Allergies 2012  No Known Allergies 10/16/2012    Family History  Arthritis  Brother, Father. Anemia  Mother. Diabetes Mellitus  Brother. Hypertension  Mother. Cancer  Father. Social History  Tobacco / smoke exposure  None. No drug use   Illicit drug use  : none  Sun Exposure  Occasionally. 2012: occasionally  Seat Belt Use  Always uses seat belts. 2012: almost always  Tobacco use  Never smoker. 2012: Never smoker. HIV risk factors  2012: no  Marital status  . Current work status  Full-time.   Caffeine use  1-2 drinks per day, Coffee, Tea. 08/17/2012: Drinks coffee, tea and soft drinks 1-2 times a day. Alcohol use  1-2 drinks per occasion, 2 times, Drinks beer, Drinks hard liquor, Drinks wine, Never drinks more than 5 drinks per occasion, per week. 08/17/2012: Drinks beer, wine and liquor 3 times per week having 1-2 drinks per occasion, rarely having more than 5 drinks per occasion. Exercise  3-4 times per week, aerobic classes, cycling, Other, running, Swimming, walking. 08/17/2012: Bicycles, runs, swims, walks and does other exercise 5-6 times a week. Medication History   Tylenol with Codeine #3  (300-30MG Tablet, 1 (one) to 2 (two) Tablet Oral every four hours to 6 hours, as needed for pain, Taken starting 01/22/2016) Active. Crestor  (10MG Tablet, Oral) Active. Medications Reconciled     Past Surgical History  Rotator Cuff Surgery  left  Wrist Surgery For Arthritis   Arthroscopy of Shoulder  left  Arthroscopy of Knee  right  Other Joint Surgery   Colon Polyp Removal - Colonoscopy   Carpal Tunnel Repair  bilateral    Other Problems  Unspecified Diagnosis   Arthritis   Hypercholesterolemia     Vitals  1/3/2017 1:54 PM  Weight: 185 lb   Height: 69 in   Weight was reported by patient. Height was reported by patient. Body Surface Area: 2 m²   Body Mass Index: 27.32 kg/m²                Physical Exam  Musculoskeletal  Global Assessment  - Note:  Patient's hips have painless range of motion. His varus alignment is about 5° and completely correctable. Pain is along the medial joint lines both knees. Both knees extend fully and flex to 120+ degrees. Assessment & Plan   REVIEW OF SYSTEMS: Systems were reviewed by the provider.(V49.9)  Current Plans  Pt Education - How to access health information online: discussed with patient and provided information. Pt Education - Educational materials were provided.: discussed with patient and provided information.   X-RAY EXAM OF KNEE 3 VIEWS (85928) (right)  X-RAY EXAM OF KNEE 3 VIEWS ([id number]) (left)  Primary osteoarthritis of both knees (715.16  M17.0)  Impression: Patient has localized osteoarthritis both knees. He has had appropriate nonoperative modalities. Discussed options and he would like to proceed with bilateral partial knee replacements.

## 2017-02-06 NOTE — PROGRESS NOTES
Primary Nurse Elzbieta Youssef and Liz Dimas RN performed a dual skin assessment on this patient No impairment noted  Harinder score is 20    Spinal site noted

## 2017-02-06 NOTE — PROGRESS NOTES
Problem: Mobility Impaired (Adult and Pediatric)  Goal: *Acute Goals and Plan of Care (Insert Text)  Physical Therapy Goals  Initiated 2/6/2017    1. Patient will move from supine to sit and sit to supine , scoot up and down and roll side to side in bed with modified independence within 4 days. 2. Patient will perform sit to stand with modified independence within 4 days. 3. Patient will ambulate with modified independence for 300 feet with the least restrictive device within 4 days. 4. Patient will ascend/descend 18 stairs with right handrail(s) with minimal assistance/contact guard assist within 4 days. 5. Patient will perform home exercise program per protocol with independence within 4 days. 6. Patient will demonstrate AROM 0-90 degrees in operative joinst within 4 days. PHYSICAL THERAPY KNEE EVALUATION  Patient: Maria M Dejesus (74 y.o. male)  Date: 2/6/2017  Primary Diagnosis: OSTEOARTHRITIS BILATERAL KNEE  DJD (degenerative joint disease) of knee  Procedure(s) (LRB):  BILATERAL UNICOMPARTMENTAL KNEE REPLACEMENT (Bilateral) Day of Surgery   Precautions:   Fall, WBAT bilaterally      ASSESSMENT :  Based on the objective data described below, the patient presents with decreased independence with mobility compared to baseline level prior to admission due to decreased strength and ROM. Patient cleared by nursing for mobility and agreeable to participate in POD #0 mobility. Patient vital signs within normal limits. Patient able to perform bed mobility and achieve EOB sitting with supervision. Patient performed sit<>stand tx with CGA and demonstrates even WB bilaterally and good immediate standing balance. Patient able to ambulate with CGA and RW x 30 feet. Patient returned to sitting and supine position in bed. Physical therapist reviewed bed exercises and acute care expectations with patient.  Patient able to correctly perform ankle pumps, heel slides, and quad sets and therapist spent additional time answering patient and family's questions. Will continue to follow to progress towards acute care goals. Recommend HHPT at d/c to continue to optimize independence with mobility. Patient will benefit from skilled intervention to address the above impairments. Patients rehabilitation potential is considered to be Good  Factors which may influence rehabilitation potential include:   [X]         None noted  [ ]         Mental ability/status  [ ]         Medical condition  [ ]         Home/family situation and support systems  [ ]         Safety awareness  [ ]         Pain tolerance/management  [ ]         Other:        PLAN :  Recommendations and Planned Interventions:  [X]           Bed Mobility Training             [X]    Neuromuscular Re-Education  [X]           Transfer Training                   [ ]    Orthotic/Prosthetic Training  [X]           Gait Training                         [ ]    Modalities  [X]           Therapeutic Exercises           [X]    Edema Management/Control  [X]           Therapeutic Activities            [X]    Patient and Family Training/Education  [ ]           Other (comment):     Frequency/Duration: Patient will be followed by physical therapy twice daily to address goals. Discharge Recommendations: Home Health  Further Equipment Recommendations for Discharge: none       SUBJECTIVE:   Patient stated When's the earliest I can go home?       OBJECTIVE DATA SUMMARY:   HISTORY:    Past Medical History   Diagnosis Date    Cancer ([facility] [address]         skin    Hyperlipemia       Past Surgical History   Procedure Laterality Date    Hx shoulder arthroscopy Left 2013    Hx knee arthroscopy Left 01/2016    Hx knee arthroscopy Right 2013    Hx orthopaedic Right 1996       wrist: carpal navicular 2x    Hx heent           WISDOM TEETH    Hx gi           COLONOSCOPY, POLYPS    Hx urological           PROSTATE, NEEDLE BIOPSY     Prior Level of Function/Home Situation: Patient lives with wife (has two daughters, both out of the house in college/grown); 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 Independent PTA. Personal factors and/or comorbidities impacting plan of care: none     Home Situation  Home Environment: Private residence  # Steps to Enter: 2  Rails to Enter: No  One/Two Story Residence: Two story  # of Interior Steps: 18  Lift Chair Available: No  Living Alone: No  Support Systems: Spouse/Significant Other/Partner  Patient Expects to be Discharged to[de-identified] Private residence  Current DME Used/Available at Home: None     EXAMINATION/PRESENTATION/DECISION MAKING:   Critical Behavior:  Neurologic State: Alert  Orientation Level: Oriented X4  Range of Motion:  AROM: Generally decreased, functional (bilateral knees)  Functional Mobility:  Bed Mobility:  Supine to Sit: Supervision  Sit to Supine: Supervision  Scooting: Supervision  Transfers:  Sit to Stand: Contact guard assistance  Stand to Sit: Contact guard assistance  Balance:   Sitting: Intact  Standing: Intact; With support  Ambulation/Gait Training:  Distance (ft): 30 Feet (ft)  Assistive Device: Walker, rolling;Gait belt  Ambulation - Level of Assistance: Contact guard assistance  Gait Abnormalities: Antalgic;Decreased step clearance  Right Side Weight Bearing: As tolerated  Left Side Weight Bearing: As tolerated  Base of Support: Widened  Speed/Alma: Pace decreased (<100 feet/min)  Step Length: Right shortened;Left shortened  Functional Measure:  Tinetti test:      Sitting Balance: 1  Arises: 1  Attempts to Rise: 2  Immediate Standing Balance: 1  Standing Balance: 1  Nudged: 1  Eyes Closed: 1  Turn 360 Degrees - Continuous/Discontinuous: 1  Turn 360 Degrees - Steady/Unsteady: 1  Sitting Down: 1  Balance Score: 11  Indication of Gait: 1  R Step Length/Height: 1  L Step Length/Height: 1  R Foot Clearance: 1  L Foot Clearance: 1  Step Symmetry: 1  Step Continuity: 1  Path: 1  Trunk: 0  Walking Time: 0  Gait Score: 8  Total Score: 19 Tinetti Test and G-code impairment scale:  Percentage of Impairment CH     0%    CI     1-19% CJ     20-39% CK     40-59% CL     60-79% CM     80-99% CN      100%   Tinetti  Score 0-28 28 23-27 17-22 12-16 6-11 1-5 0          Tinetti Tool Score Risk of Falls  <19 = High Fall Risk  19-24 = Moderate Fall Risk  25-28 = Low Fall Risk  Tinetti ME. Performance-Oriented Assessment of Mobility Problems in Elderly Patients. [facility] 66; [id number]. (Scoring Description: PT Bulletin Feb. 10, 1993)     Older adults: Akhil Reyes et al, 2009; n = 1000 [facility] elderly evaluated with ABC, ELYSE, ADL, and IADL)  · Mean ELYSE score for males aged 69-68 years = 26.21(3.40)  · Mean ELYSE score for females age 69-68 years = 25.16(4.30)  · Mean ELYSE score for males over 80 years = 23.29(6.02)  · Mean ELYSE score for females over 80 years = 17.20(8.32)            G codes: In compliance with CMSs Claims Based Outcome Reporting, the following G-code set was chosen for this patient based on their primary functional limitation being treated: The outcome measure chosen to determine the severity of the functional limitation was the Tinetti with a score of 19/28 which was correlated with the impairment scale.       · Mobility - Walking and Moving Around:               - CURRENT STATUS:    CJ - 20%-39% impaired, limited or restricted               - GOAL STATUS:           CI - 1%-19% impaired, limited or restricted               - D/C STATUS:                       ---------------To be determined---------------         Physical Therapy Evaluation Charge Determination   History Examination Presentation Decision-Making   LOW Complexity : Zero comorbidities / personal factors that will impact the outcome / POC MEDIUM Complexity : 3 Standardized tests and measures addressing body structure, function, activity limitation and / or participation in recreation  LOW Complexity : Stable, uncomplicated  Other outcome measures Tinetti 19/28 MEDIUM      Based on the above components, the patient evaluation is determined to be of the following complexity level: LOW      Pain:  Pain Scale 1: Numeric (0 - 10)  Pain Intensity 1: 0  Activity Tolerance:   Good, no distress  Please refer to the flowsheet for vital signs taken during this treatment. After treatment:   [ ]         Patient left in no apparent distress sitting up in chair  [X]         Patient left in no apparent distress in bed  [X]         Call bell left within reach  [X]         Nursing notified  [X]         Caregiver present  [ ]         Bed alarm activated      COMMUNICATION/EDUCATION:   The patients plan of care was discussed with: Registered Nurse.  [X]         Fall prevention education was provided and the patient/caregiver indicated understanding. [X]         Patient/family have participated as able in goal setting and plan of care. [X]         Patient/family agree to work toward stated goals and plan of care. [ ]         Patient understands intent and goals of therapy, but is neutral about his/her participation. [ ]         Patient is unable to participate in goal setting and plan of care.      Thank you for this referral.  Sona Ricketts, PT, DPT   Time Calculation: 40 mins

## 2017-02-06 NOTE — ANESTHESIA POSTPROCEDURE EVALUATION
Post-Anesthesia Evaluation and Assessment    Patient: Jam Marroquin MRN: [id number]  SSN: [id number]    YOB: 1955  Age: 64 y.o. Sex: male       Cardiovascular Function/Vital Signs  Visit Vitals    /57    Pulse 94    Temp 36.7 °C (98.1 °F)    Resp 15    Ht 5' 9\" (1.753 m)    Wt 86.2 kg (190 lb)    SpO2 96%    BMI 28.06 kg/m2       Patient is status post spinal anesthesia for Procedure(s):  BILATERAL UNICOMPARTMENTAL KNEE REPLACEMENT. Nausea/Vomiting: None    Postoperative hydration reviewed and adequate. Pain:  Pain Scale 1: Numeric (0 - 10) (02/06/17 1421)  Pain Intensity 1: 0 (02/06/17 1421)   Managed    Neurological Status:   Neuro (WDL): Exceptions to WDL (02/06/17 1421)  Neuro  LLE Motor Response: Numbness (02/06/17 1421)  RLE Motor Response: Numbness (02/06/17 1421)   At baseline    Mental Status and Level of Consciousness: Arousable    Pulmonary Status:   O2 Device: Nasal cannula (02/06/17 1421)   Adequate oxygenation and airway patent    Complications related to anesthesia: None    Post-anesthesia assessment completed.  No concerns    Signed By: Tigist Childs MD     February 6, 2017

## 2017-02-06 NOTE — ANESTHESIA PREPROCEDURE EVALUATION
Anesthetic History   No history of anesthetic complications            Review of Systems / Medical History  Patient summary reviewed, nursing notes reviewed and pertinent labs reviewed    Pulmonary  Within defined limits                 Neuro/Psych   Within defined limits           Cardiovascular  Within defined limits                     GI/Hepatic/Renal  Within defined limits              Endo/Other  Within defined limits           Other Findings              Physical Exam    Airway  Mallampati: II  TM Distance: > 6 cm  Neck ROM: normal range of motion   Mouth opening: Normal     Cardiovascular  Regular rate and rhythm,  S1 and S2 normal,  no murmur, click, rub, or gallop             Dental    Dentition: Upper dentition intact and Lower dentition intact     Pulmonary  Breath sounds clear to auscultation               Abdominal  GI exam deferred       Other Findings            Anesthetic Plan    ASA: 2  Anesthesia type: spinal      Post-op pain plan if not by surgeon: intrathecal opiates      Anesthetic plan and risks discussed with: Patient

## 2017-02-06 NOTE — ANESTHESIA PROCEDURE NOTES
Spinal Block    Start time: 2/6/2017 10:39 AM  End time: 2/6/2017 10:43 AM  Performed by: Albania Chu  Authorized by: Albania Chu     Pre-procedure:   Indications: primary anesthetic  Preanesthetic Checklist: patient identified, risks and benefits discussed, anesthesia consent, site marked, patient being monitored and timeout performed    Timeout Time: 10:39          Spinal Block:   Patient Position:  Seated  Prep Region:  Lumbar  Prep: Betadine and patient draped      Location:  L3-4  Technique:  Single shot  Local:  Lidocaine 1%      Needle:   Needle Type:  Pencil-tip  Needle Gauge:  25 G  Attempts:  1      Events: CSF confirmed, no blood with aspiration and no paresthesia        Assessment:  Insertion:  Uncomplicated  Patient tolerance:  Patient tolerated the procedure well with no immediate complications

## 2017-02-06 NOTE — PERIOP NOTES
TRANSFER - OUT REPORT:    Verbal report given to Chris Jennifer on Godley Plate  being transferred to [phone] for routine post - op       Report consisted of patients Situation, Background, Assessment and   Recommendations(SBAR). Time Pre op antibiotic given:1034  Anesthesia Stop time: 7759  Information from the following report(s) SBAR, OR Summary, Intake/Output and MAR was reviewed with the receiving nurse. Opportunity for questions and clarification was provided. Is the patient on 02? NO       Is the patient on a monitor? NO    Is the nurse transporting with the patient? NO    Surgical Waiting Area notified of patient's transfer from PACU?  YES      The following personal items collected during your admission accompanied patient upon transfer:   Dental Appliance: Dental Appliances: None  Vision:    Hearing Aid:    Jewelry:    Clothing:    Other Valuables:    Valuables sent to safe:

## 2017-02-07 ENCOUNTER — HOME HEALTH ADMISSION (OUTPATIENT)
Dept: HOME HEALTH SERVICES | Facility: HOME HEALTH | Age: 62
End: 2017-02-07
Payer: COMMERCIAL

## 2017-02-07 VITALS
HEART RATE: 56 BPM | HEIGHT: 69 IN | SYSTOLIC BLOOD PRESSURE: 145 MMHG | RESPIRATION RATE: 16 BRPM | BODY MASS INDEX: 28.15 KG/M2 | OXYGEN SATURATION: 98 % | DIASTOLIC BLOOD PRESSURE: 89 MMHG | WEIGHT: 190.04 LBS | TEMPERATURE: 98.2 F

## 2017-02-07 LAB
ANION GAP BLD CALC-SCNC: 8 MMOL/L (ref 5–15)
BUN SERPL-MCNC: 18 MG/DL (ref 6–20)
BUN/CREAT SERPL: 17 (ref 12–20)
CALCIUM SERPL-MCNC: 8.5 MG/DL (ref 8.5–10.1)
CHLORIDE SERPL-SCNC: 103 MMOL/L (ref 97–108)
CO2 SERPL-SCNC: 26 MMOL/L (ref 21–32)
CREAT SERPL-MCNC: 1.08 MG/DL (ref 0.7–1.3)
GLUCOSE SERPL-MCNC: 111 MG/DL (ref 65–100)
HGB BLD-MCNC: 12 G/DL (ref 12.1–17)
POTASSIUM SERPL-SCNC: 4.1 MMOL/L (ref 3.5–5.1)
SODIUM SERPL-SCNC: 137 MMOL/L (ref 136–145)

## 2017-02-07 PROCEDURE — 74011250637 HC RX REV CODE- 250/637: Performed by: ANESTHESIOLOGY

## 2017-02-07 PROCEDURE — 97530 THERAPEUTIC ACTIVITIES: CPT

## 2017-02-07 PROCEDURE — 74011250636 HC RX REV CODE- 250/636: Performed by: PHYSICIAN ASSISTANT

## 2017-02-07 PROCEDURE — 85018 HEMOGLOBIN: CPT | Performed by: ORTHOPAEDIC SURGERY

## 2017-02-07 PROCEDURE — 97116 GAIT TRAINING THERAPY: CPT

## 2017-02-07 PROCEDURE — 74011250637 HC RX REV CODE- 250/637: Performed by: PHYSICIAN ASSISTANT

## 2017-02-07 PROCEDURE — 36415 COLL VENOUS BLD VENIPUNCTURE: CPT | Performed by: ORTHOPAEDIC SURGERY

## 2017-02-07 PROCEDURE — 80048 BASIC METABOLIC PNL TOTAL CA: CPT | Performed by: ORTHOPAEDIC SURGERY

## 2017-02-07 RX ORDER — ASPIRIN 325 MG
325 TABLET, DELAYED RELEASE (ENTERIC COATED) ORAL 2 TIMES DAILY
Qty: 60 TAB | Refills: 0 | Status: SHIPPED | OUTPATIENT
Start: 2017-02-07

## 2017-02-07 RX ORDER — OXYCODONE HYDROCHLORIDE 5 MG/1
5-10 TABLET ORAL
Qty: 70 TAB | Refills: 0 | Status: SHIPPED | OUTPATIENT
Start: 2017-02-07

## 2017-02-07 RX ADMIN — ACETAMINOPHEN 650 MG: 325 TABLET, FILM COATED ORAL at 11:05

## 2017-02-07 RX ADMIN — KETOROLAC TROMETHAMINE 30 MG: 30 INJECTION, SOLUTION INTRAMUSCULAR at 08:43

## 2017-02-07 RX ADMIN — DEXAMETHASONE SODIUM PHOSPHATE 10 MG: 10 INJECTION, SOLUTION INTRAMUSCULAR; INTRAVENOUS at 11:05

## 2017-02-07 RX ADMIN — CEFAZOLIN 2 G: 1 INJECTION, POWDER, FOR SOLUTION INTRAMUSCULAR; INTRAVENOUS; PARENTERAL at 03:08

## 2017-02-07 RX ADMIN — DOCUSATE SODIUM AND SENNOSIDES 1 TABLET: 8.6; 5 TABLET, FILM COATED ORAL at 08:43

## 2017-02-07 RX ADMIN — ASPIRIN 325 MG: 325 TABLET, DELAYED RELEASE ORAL at 08:43

## 2017-02-07 RX ADMIN — ACETAMINOPHEN 650 MG: 325 TABLET, FILM COATED ORAL at 03:08

## 2017-02-07 RX ADMIN — POLYETHYLENE GLYCOL 3350 17 G: 17 POWDER, FOR SOLUTION ORAL at 08:42

## 2017-02-07 RX ADMIN — KETOROLAC TROMETHAMINE 30 MG: 30 INJECTION, SOLUTION INTRAMUSCULAR at 03:09

## 2017-02-07 NOTE — PROGRESS NOTES
Reviewed chart. CM met with pt to introduce role of CM and discuss discharge needs. Pt lives with wife Irvin Blue ([phone]) in a private residence in [place]. Offered freedom of choice for HH, pt had no preference. Pt agreeable to using [facility]. CM sent referral via [employer]. Family will transport pt home via car at discharge. [facility] is willing to accept pt. Care Management Interventions  PCP Verified by CM: Yes  Mode of Transport at Discharge:  Other (see comment) (family)  Transition of Care Consult (CM Consult): [address]: Yes  MyChart Signup: No  Discharge Durable Medical Equipment: No  Physical Therapy Consult: Yes  Occupational Therapy Consult: No  Speech Therapy Consult: No  Current Support Network: Lives with Spouse, Own Home  Confirm Follow Up Transport: Family  Plan discussed with Pt/Family/Caregiver: Yes  Freedom of Choice Offered: Yes  Discharge Location  Discharge Placement: Home with home health     ERWIN Rojas/CRM

## 2017-02-07 NOTE — PROGRESS NOTES
I have reviewed discharge instructions with the patient and spouse. The patient and spouse verbalized understanding. Patient leaving via car with wife. Patient leaving with discharge instructions, scripts, and side effects fact sheet. Patient leaving via wheelchair with volunteers.

## 2017-02-07 NOTE — PROGRESS NOTES
Anesthesia postop pain note  POD#1 s/p spinal Duramorph. Pain well controlled overnight. No nausea/vomiting/pruritis/respiratory depression.   Plan: Continue spinal duramorph orderset for first 24hrs then pain management per surgical team.

## 2017-02-07 NOTE — PROGRESS NOTES
Complaints: none   Events: none    Patient afebrile/ Vital signs stable. GEN:  NAD. AOx3   ABD:  S/NT/ND   LLE:  Dressing C/D/I    5/5 motor    Calf nttp (Bilat)    Sensate all distribution to light touch    1+ dp/pt pulses, foot perfused    Lab Results   Component Value Date/Time    HGB 12.0 02/07/2017 03:24 AM    INR 1.0 01/16/2017 11:33 AM       Lab Results   Component Value Date/Time    Sodium 137 02/07/2017 03:24 AM    Potassium 4.1 02/07/2017 03:24 AM    Chloride 103 02/07/2017 03:24 AM    CO2 26 02/07/2017 03:24 AM    BUN 18 02/07/2017 03:24 AM    Creatinine 1.08 02/07/2017 03:24 AM    Calcium 8.5 02/07/2017 03:24 AM       POD#1 TRE TKA. Doing Well    ABX: Complete today  PATHWAY: D/C [place] per protocol  DVT Prophylaxis: Aspirin, SCD, TASIA   Weight Bearing: WBAT BLE   Pain Control: PRN oral narcotics  Expected Discharge Date: 2-7-17   Disposition: Home, PT.

## 2017-02-07 NOTE — PROGRESS NOTES
Bedside and Verbal shift change report given to [facility]  (oncoming nurse) by Shelia Nation  (offgoing nurse). Report included the following information SBAR and Kardex.

## 2017-02-07 NOTE — PROGRESS NOTES
Problem: Discharge Planning  Goal: *Discharge to safe environment  Outcome: Progressing Towards Goal  Discharge disposition: Home with HH.      ERWIN Gonzalez/CRM

## 2017-02-07 NOTE — PROGRESS NOTES
Problem: Mobility Impaired (Adult and Pediatric)  Goal: *Acute Goals and Plan of Care (Insert Text)  Physical Therapy Goals  Initiated 2/6/2017    1. Patient will move from supine to sit and sit to supine , scoot up and down and roll side to side in bed with modified independence within 4 days. 2. Patient will perform sit to stand with modified independence within 4 days. 3. Patient will ambulate with modified independence for 300 feet with the least restrictive device within 4 days. 4. Patient will ascend/descend 18 stairs with right handrail(s) with minimal assistance/contact guard assist within 4 days. 5. Patient will perform home exercise program per protocol with independence within 4 days. 6. Patient will demonstrate AROM 0-90 degrees in operative joinst within 4 days. PHYSICAL THERAPY TREATMENT  Patient: Kirstin Germain (52 y.o. 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 ASSESSMENT:  Pt received supine in bed w/HOB elevated;agreeable to PT. Pt able to correctly perform supine LE extremity exercises in bed w/supervision. Pt continues to demonstrate CGA-Supervision for bed mobility and Transfers (w/RW);VSS w/transfers. Pt improved gait distance this AM, amb 300 FT w/RW and CGA. Pt able to perform and cleared stairs, ascend/descend 4 stairs w/both rails;pt able to use step over step method despite verbal cues to use step to step. Pt returned to chair at bedside, needs met, items in reach. Pt clear from PT standpoint, RN aware.   Progression toward goals:  [X]      Improving appropriately and progressing toward goals  [ ]      Improving slowly and progressing toward goals  [ ]      Not making progress toward goals and plan of care will be adjusted       PLAN:  Patient continues to benefit from skilled intervention to address the above impairments. Continue treatment per established plan of care. Discharge Recommendations:  Home Health  Further Equipment Recommendations for Discharge:  rolling walker, Owns       SUBJECTIVE:   Patient stated Does blood pressure go up when playing football?       OBJECTIVE DATA SUMMARY:   Critical Behavior:  Neurologic State: Alert  Orientation Level: Oriented X4        Range of Motion:   unable due to ace wrap                          Functional Mobility Training:  Bed Mobility:     Supine to Sit: Supervision  Sit to Supine: Supervision  Scooting: Supervision                    Transfers:  Sit to Stand: Contact guard assistance  Stand to Sit: Contact guard assistance                             Balance:  Sitting: Intact  Standing: Intact; With support  Ambulation/Gait Training:  Distance (ft): 300 Feet (ft)  Assistive Device: Gait belt;Walker, rolling  Ambulation - Level of Assistance: Contact guard assistance        Gait Abnormalities: Decreased step clearance  Right Side Weight Bearing: As tolerated  Left Side Weight Bearing: As tolerated  Base of Support: Narrowed     Speed/Alma: Pace decreased (<100 feet/min)  Step Length: Left lengthened;Right lengthened        Interventions: Verbal cues                      Stairs:  Number of Stairs Trained: 4  Stairs - Level of Assistance: Contact guard assistance  Rail Use: Both  Therapeutic Exercises:     EXERCISE   Sets   Reps   Active Active Assist   Passive Self ROM   Comments   Ankle Pumps     [ ]                                [ ]                                [ ]                                [ ]                                    Quad Sets   10 [X]                                [ ]                                [ ]                                [ ]                                Bilaterally;supine   Hamstring Sets     [ ] [ ]                                [ ]                                [ ]                                    Xiomara Boothress     [ ]                                [ ]                                [ ]                                [ ]                                    Yesenia Snuffer       [ ]                                  [ ]                                  [ ]                                  [ ]                                    Heel Slides   10 [X]                                [ ]                                [ ]                                [ ]                                Bilaterally; supine   Long Arc Quads   10 [X]                                [ ]                                [ ]                                [ ]                                Bilaterally; Seated   Knee Flexion Stretch   10 [X]                                [ ]                                [ ]                                [ ]                                Bilaterally; seated   Straight Leg Raises     [ ]                                [ ]                                [ ]                                [ ]                                          Pain:  Pain Scale 1: Numeric (0 - 10)  Pain Intensity 1: 0              Activity Tolerance:   Good  Please refer to the flowsheet for vital signs taken during this treatment.   After treatment:   [X] Patient left in no apparent distress sitting up in chair  [ ] Patient left in no apparent distress in bed  [X] Call bell left within reach  [X] Nursing notified  [ ] Caregiver present  [ ] Bed alarm activated      COMMUNICATION/COLLABORATION:   The patients plan of care was discussed with: Registered Nurse Teodoro HERNANDEZ Means,PTA   Time Calculation: 30 mins

## 2017-02-07 NOTE — PROGRESS NOTES
Bedside shift change report given to Levy Vaughn RN (oncoming nurse) by Jj Stauffer RN(offgoing nurse). Report given with SBAR.

## 2017-02-08 ENCOUNTER — HOME CARE VISIT (OUTPATIENT)
Dept: SCHEDULING | Facility: HOME HEALTH | Age: 62
End: 2017-02-08
Payer: COMMERCIAL

## 2017-02-08 VITALS
SYSTOLIC BLOOD PRESSURE: 140 MMHG | RESPIRATION RATE: 16 BRPM | TEMPERATURE: 98 F | HEIGHT: 69 IN | BODY MASS INDEX: 27.4 KG/M2 | HEART RATE: 60 BPM | DIASTOLIC BLOOD PRESSURE: 80 MMHG | WEIGHT: 185 LBS | OXYGEN SATURATION: 97 %

## 2017-02-08 PROCEDURE — G0151 HHCP-SERV OF PT,EA 15 MIN: HCPCS

## 2017-02-10 ENCOUNTER — HOME CARE VISIT (OUTPATIENT)
Dept: SCHEDULING | Facility: HOME HEALTH | Age: 62
End: 2017-02-10
Payer: COMMERCIAL

## 2017-02-10 VITALS
DIASTOLIC BLOOD PRESSURE: 70 MMHG | RESPIRATION RATE: 16 BRPM | SYSTOLIC BLOOD PRESSURE: 135 MMHG | OXYGEN SATURATION: 94 % | TEMPERATURE: 98 F | HEART RATE: 60 BPM

## 2017-02-10 PROCEDURE — G0151 HHCP-SERV OF PT,EA 15 MIN: HCPCS

## 2017-02-10 PROCEDURE — G0299 HHS/HOSPICE OF RN EA 15 MIN: HCPCS

## 2017-02-12 VITALS
RESPIRATION RATE: 16 BRPM | OXYGEN SATURATION: 95 % | SYSTOLIC BLOOD PRESSURE: 138 MMHG | HEART RATE: 80 BPM | DIASTOLIC BLOOD PRESSURE: 80 MMHG

## 2017-02-13 ENCOUNTER — HOME CARE VISIT (OUTPATIENT)
Dept: SCHEDULING | Facility: HOME HEALTH | Age: 62
End: 2017-02-13
Payer: COMMERCIAL

## 2017-02-13 VITALS
RESPIRATION RATE: 16 BRPM | DIASTOLIC BLOOD PRESSURE: 70 MMHG | HEART RATE: 80 BPM | SYSTOLIC BLOOD PRESSURE: 128 MMHG | OXYGEN SATURATION: 98 %

## 2017-02-13 PROCEDURE — G0151 HHCP-SERV OF PT,EA 15 MIN: HCPCS

## 2017-02-16 ENCOUNTER — HOME CARE VISIT (OUTPATIENT)
Dept: HOME HEALTH SERVICES | Facility: HOME HEALTH | Age: 62
End: 2017-02-16
Payer: COMMERCIAL

## 2017-02-21 ENCOUNTER — HOME CARE VISIT (OUTPATIENT)
Dept: HOME HEALTH SERVICES | Facility: HOME HEALTH | Age: 62
End: 2017-02-21
Payer: COMMERCIAL

## 2020-03-27 NOTE — IP AVS SNAPSHOT
[address] 
[phone] Patient: Romeo Valencia MRN: [id number] ASP:0/80/9855 You are allergic to the following No active allergies Recent Documentation Height Weight BMI Smoking Status 1.753 m 86.2 kg 28.06 kg/m2 Never Smoker Emergency Contacts Name Discharge Info Relation Home Work Mobile Deedee Mosqueda DISCHARGE CAREGIVER [3] Spouse [3]   [phone] Tristan Mosqueda  Spouse [3] [phone] About your hospitalization You were admitted on:  February 6, 2017 [facility] on:  February 7, 2017 Unit phone number:  [phone] Why you were hospitalized Your primary diagnosis was:  Djd (Degenerative Joint Disease) Of Knee Providers Seen During Your Hospitalizations Provider Role Specialty Primary office phone Marylee Heidelberg, MD Attending Provider Orthopedic Surgery [phone] Your Primary Care Physician (PCP) Primary Care Physician Office Phone Office Fax Sari Jenkins [phone] Follow-up Information Follow up With Details Comments Contact Info Luz Todd MD   [address] [place] [address] 
[phone] [facility] 227 On 2/8/2017 For home health. Please contact agency if you have not heard from them by 12 PM the first full day after discharge. [address] [facility] [address] 
[phone] Current Discharge Medication List  
  
START taking these medications Dose & Instructions Dispensing Information Comments Morning Noon Evening Bedtime  
 aspirin delayed-release 325 mg tablet Your next dose is: Today, Tomorrow Other:  _________ Dose:  325 mg Take 1 Tab by mouth two (2) times a day. Quantity:  60 Tab Refills:  0 oxyCODONE IR 5 mg immediate release tablet Commonly known as:  Genanika F[place]om Your next dose is: Today, Tomorrow Other:  _________ Dose:  5-10 mg Take 1-2 Tabs by mouth every four (4) hours as needed for Pain. Max Daily Amount: 60 mg.  
 Quantity:  70 Tab Refills:  0 CONTINUE these medications which have NOT CHANGED Dose & Instructions Dispensing Information Comments Morning Noon Evening Bedtime  
 atorvastatin 20 mg tablet Commonly known as:  LIPITOR Your next dose is: Today, Tomorrow Other:  _________ Take  by mouth nightly. Refills:  0 STOP taking these medications   
 ibuprofen 200 mg tablet Commonly known as:  MOTRIN  
   
  
 multivitamin tablet Commonly known as:  ONE A DAY Where to Get Your Medications Information on where to get these meds will be given to you by the nurse or doctor. ! Ask your nurse or doctor about these medications  
  aspirin delayed-release 325 mg tablet  
 oxyCODONE IR 5 mg immediate release tablet Discharge Instructions After Hospital Care Plan:  Discharge Instructions Knee Replacement Dr. Alanna Garcia Patient Name: Chari Howard Date of procedure: 2/6/2017 Procedure: Procedure(s): BILATERAL UNICOMPARTMENTAL KNEE REPLACEMENT Surgeon: Surgeon(s) and Role: Cj Finnegan MD - Primary PCP: Ying Campoverde MD 
Date of discharge: No discharge date for patient encounter. Follow up appointments ? Follow up with Dr. Alanna Garcia in 3 weeks. Call [phone] to make an appointment. ? If home health has been arranged for you the agency will contact you to arrange dates/times for visits. Please call them if you do not hear from them within 24 hours after you are discharged When to call your Orthopaedic Surgeon: Call [phone].  If you call after 5pm or on a weekend, the on call physician will be contacted ? Unrelieved pain ? Signs of infection-if your incision is red; continues to have drainage; drainage has a foul odor or if you have a persistent fever over 101 degrees ? Signs of a blood clot in your leg-calf pain, tenderness, redness, swelling of lower leg When to call your Primary Care Physician: 
? Concerns about medical conditions such as diabetes, high blood pressure, asthma, congestive heart failure ? Call if blood sugars are elevated, persistent headache or dizziness, coughing or congestion, constipation or diarrhea, burning with urination, abnormal heart rate When to call 911and go to the nearest emergency room ? Acute onset of chest pain, shortness of breath, difficulty breathing Activity ? Weight bearing as tolerated with walker or crutches. Refer to pages 23-31 of your handbook for instructions and pictures ? Complete your Home Exercise Program daily as instructed by your therapist.  Refer to pages 33-41 of your handbook for instructions and pictures ? Get up every one hour and walk (except at night when sleeping) ? Do not drive or operate heavy machinery Incision Care ? Your incision has been closed with absorbable sutures and the Dermabond  Prineo skin closure system which is a combination of a transparent mesh and a l iquid adhesive that will assist with healing. ? The Dermabond Prineo mesh is to remain over your incision for 3 weeks. ? A dry dressing (ABD and paper tape) will be placed over it and should be  changed daily. Please make sure to wash your hands prior to touching your  dressing. ? You may shower daily with the Dermabond Prineo mesh in place. After your  shower blot your incision dry with a soft towel and place a new dry dressing  (ABD and paper tape) over your incision. Do NOT allow the tape to come in  contact with the Prineo mesh. ? If you experience drainage leaking from under the Prineo mesh or if it peels off  before 3 weeks, please contact my office. ? The Prineo mesh will be removed during your first office follow up visit. You may  then shower and let your incision get wet but do not submerge your incision  under water in a bath tub, hot tub or swimming pool for 6 weeks after surgery. Preventing blood clots ? Take one coated Aspirin twice a day for one month following surgery ? Wear elastic stockings (TEDS) for 4 weeks. You may remove them for approximately 1 hour daily for showering/sponge bathing Pain management ? Take pain medication as prescribed; decrease the amount you use as your pain lessens ? Avoid alcoholic beverages while taking pain medication ? Do not take any over-the-counter medication for pain except Tylenol (acetaminophen) ? Do not take more than 4 Grams of Tylenol in a 24 hour period. (There are 1000 milligrams in one Gram) o Tylenol 325 mg tablets-do not take more than 12 tablets in a 24 hour period. o Tylenol 500 mg tablets-do not take more than 8 tablets in a 24 hour period 
o Tylenol 650 mg tablets-do not take more than 6 tablets in a 24 hour period ? You may place an ice bag on your knee for 15-20 minutes after exercising and as needed throughout the day and night Diet ? Resume usual diet; drink plenty of fluids; eat foods high in fiber ? You may want to take a stool softener (such as Senokot-S or Colace) to prevent constipation while you are taking pain medication. If constipation occurs, take a laxative (such as Dulcolax tablets, Milk of Magnesia, or a suppository) Home Health Care Protocol (to be followed by [address]) Nursing-per physicians order ? Complete head to toe assessment, vital signs ? Medication reconciliation ? Review pain management ? Manage chronic medical conditions Physical Therapy-per physicians order Weight bearing status: Vanco trough result prior to 4th dose at 10 pm Mobility Status: 
  
  
  
  
 
Gait: 
  
  
  
  
 
ADL status overall composite: 
  
  
  
  
  
 
Physical Therapy ? Assessment and evaluation-bed mobility; functional transfers (bed, chair, bathroom, stairs); ambulation with equipment, car transfers, shower transfers, safety and ability to get out of house in the event of an emergency ? Review and maintain weight bearing as tolerated ? Discuss pain management ? Review how to do ADLs. Refer to page 42 of patient handbook Home Exercise Program-refer to pages 33-41 of the patient handbook for exercises Discharge Orders None Introducing [facility] SERVICES! [employer] introduces [employer] patient portal. Now you can access parts of your medical record, email your doctor's office, and request medication refills online. 1. In your internet browser, go to [email or web address] 2. Click on the First Time User? Click Here link in the Sign In box. You will see the New Member Sign Up page. 3. Enter your [employer] Access Code exactly as it appears below. You will not need to use this code after youve completed the sign-up process. If you do not sign up before the expiration date, you must request a new code. · [employer] Access Code: [email or web address] Expires: 4/16/2017 10:19 AM 
 
4. Enter the last four digits of your Social Security Number (xxxx) and Date of Birth (mm/dd/yyyy) as indicated and click Submit. You will be taken to the next sign-up page. 5. Create a [employer] ID. This will be your [employer] login ID and cannot be changed, so think of one that is secure and easy to remember. 6. Create a [employer] password. You can change your password at any time. 7. Enter your Password Reset Question and Answer. This can be used at a later time if you forget your password. 8. Enter your e-mail address. You will receive e-mail notification when new information is available in [address] 9. Click Sign Up. You can now view and download portions of your medical record. 10. Click the Download Summary menu link to download a portable copy of your medical information. If you have questions, please visit the Frequently Asked Questions section of the [employer] website. Remember, [employer] is NOT to be used for urgent needs. For medical emergencies, dial 911. Now available from your iPhone and Android! General Information Please provide this summary of care documentation to your next provider. Patient Signature:  ____________________________________________________________ Date:  ____________________________________________________________  
  
Oumar Bedolla Provider Signature:  ____________________________________________________________ Date:  ____________________________________________________________

## 2020-12-29 LAB — PSA, EXTERNAL: 13.1

## 2021-02-04 PROBLEM — N41.9 PROSTATITIS: Status: ACTIVE | Noted: 2021-02-04

## 2021-02-04 PROBLEM — R79.89 DECREASED TESTOSTERONE LEVEL: Status: ACTIVE | Noted: 2021-02-04

## 2021-02-15 DIAGNOSIS — R97.20 ELEVATED PROSTATE SPECIFIC ANTIGEN (PSA): ICD-10-CM

## 2021-02-15 DIAGNOSIS — R79.89 DECREASED TESTOSTERONE LEVEL: ICD-10-CM

## 2021-02-15 DIAGNOSIS — N40.0 BENIGN PROSTATIC HYPERPLASIA, UNSPECIFIED WHETHER LOWER URINARY TRACT SYMPTOMS PRESENT: ICD-10-CM

## 2021-02-15 DIAGNOSIS — N41.9 PROSTATITIS, UNSPECIFIED PROSTATITIS TYPE: ICD-10-CM

## 2021-02-26 ENCOUNTER — TELEPHONE (OUTPATIENT)
Dept: UROLOGY | Age: 66
End: 2021-02-26

## 2021-02-26 NOTE — TELEPHONE ENCOUNTER
Pt called and stated that he was at the [facility] office for his appt today and was told via phone call that someone told him to come to the [facility] office today and I told him that Dr. Deyanira Campbell is in [place] only on Wednesdays so I'm not sure how he get mixed up and went there. I said that he could come to [place] but he would have a little wait because we would be working him in. He said he did not want to do that and could reschedule. I gave him a date and he was not satisfied with that he said I need to be seen now I told him that that was the next available appt and he still got upset. He said if Dr. Deyanira Campbell wants to keep me as a patient he needs to reach out to me with an earlier date to be seen unless he is going to find another urologist and get his medical records sent elsewhere and hung up the phone.

## 2021-03-08 ENCOUNTER — TELEPHONE (OUTPATIENT)
Dept: UROLOGY | Age: 66
End: 2021-03-08

## 2021-03-08 NOTE — TELEPHONE ENCOUNTER
Spoke with pt and let him know I would fax over his records to [facility] and him as well. Waiting for confimation

## 2021-12-18 ENCOUNTER — TELEPHONE (OUTPATIENT)
Dept: ORTHOPEDIC SURGERY | Age: 66
End: 2021-12-18

## 2021-12-18 NOTE — TELEPHONE ENCOUNTER
PAULA from [employer] w/ Mera Rodarte and Florencia Durbin  for images and records.  CD mailed and release fwd to [facility]

## 2022-03-18 PROBLEM — M17.9 DJD (DEGENERATIVE JOINT DISEASE) OF KNEE: Status: ACTIVE | Noted: 2017-02-05

## 2022-03-18 PROBLEM — R97.20 ELEVATED PROSTATE SPECIFIC ANTIGEN (PSA): Status: ACTIVE | Noted: 2021-02-15

## 2022-03-19 PROBLEM — N40.0 BPH (BENIGN PROSTATIC HYPERPLASIA): Status: ACTIVE | Noted: 2021-02-15

## 2022-03-19 PROBLEM — R79.89 DECREASED TESTOSTERONE LEVEL: Status: ACTIVE | Noted: 2021-02-04

## 2022-03-20 PROBLEM — N41.9 PROSTATITIS: Status: ACTIVE | Noted: 2021-02-04

## 2024-02-08 ENCOUNTER — TRANSCRIBE ORDERS (OUTPATIENT)
Facility: HOSPITAL | Age: 69
End: 2024-02-08

## 2024-02-08 DIAGNOSIS — S46.312A TRICEPS TENDON RUPTURE, LEFT, INITIAL ENCOUNTER: Primary | ICD-10-CM

## 2024-02-10 ENCOUNTER — HOSPITAL ENCOUNTER (OUTPATIENT)
Facility: HOSPITAL | Age: 69
End: 2024-02-10
Attending: ORTHOPAEDIC SURGERY
Payer: MEDICARE

## 2024-02-10 DIAGNOSIS — S46.312A TRICEPS TENDON RUPTURE, LEFT, INITIAL ENCOUNTER: ICD-10-CM

## 2024-02-10 PROCEDURE — 73221 MRI JOINT UPR EXTREM W/O DYE: CPT

## 2024-03-29 ENCOUNTER — HOSPITAL ENCOUNTER (OUTPATIENT)
Facility: HOSPITAL | Age: 69
End: 2024-03-29
Payer: MEDICARE

## 2024-03-29 VITALS
BODY MASS INDEX: 27.49 KG/M2 | RESPIRATION RATE: 20 BRPM | SYSTOLIC BLOOD PRESSURE: 154 MMHG | TEMPERATURE: 98 F | HEIGHT: 69 IN | DIASTOLIC BLOOD PRESSURE: 80 MMHG | HEART RATE: 60 BPM | WEIGHT: 185.6 LBS

## 2024-03-29 LAB
ALBUMIN SERPL-MCNC: 3.8 G/DL (ref 3.5–5)
ALBUMIN/GLOB SERPL: 1.3 (ref 1.1–2.2)
ALP SERPL-CCNC: 138 U/L (ref 45–117)
ALT SERPL-CCNC: 25 U/L (ref 12–78)
ANION GAP SERPL CALC-SCNC: 7 MMOL/L (ref 5–15)
APPEARANCE UR: CLEAR
AST SERPL-CCNC: 20 U/L (ref 15–37)
BACTERIA URNS QL MICRO: NEGATIVE /HPF
BASOPHILS # BLD: 0 K/UL (ref 0–0.1)
BASOPHILS NFR BLD: 1 % (ref 0–1)
BILIRUB SERPL-MCNC: 0.5 MG/DL (ref 0.2–1)
BILIRUB UR QL: NEGATIVE
BUN SERPL-MCNC: 12 MG/DL (ref 6–20)
BUN/CREAT SERPL: 11 (ref 12–20)
CALCIUM SERPL-MCNC: 9.6 MG/DL (ref 8.5–10.1)
CHLORIDE SERPL-SCNC: 107 MMOL/L (ref 97–108)
CO2 SERPL-SCNC: 26 MMOL/L (ref 21–32)
COLOR UR: ABNORMAL
CREAT SERPL-MCNC: 1.06 MG/DL (ref 0.7–1.3)
DIFFERENTIAL METHOD BLD: NORMAL
EKG ATRIAL RATE: 51 BPM
EKG DIAGNOSIS: NORMAL
EKG P AXIS: 18 DEGREES
EKG P-R INTERVAL: 180 MS
EKG Q-T INTERVAL: 426 MS
EKG QRS DURATION: 104 MS
EKG QTC CALCULATION (BAZETT): 392 MS
EKG R AXIS: 54 DEGREES
EKG T AXIS: 11 DEGREES
EKG VENTRICULAR RATE: 51 BPM
EOSINOPHIL # BLD: 0.2 K/UL (ref 0–0.4)
EOSINOPHIL NFR BLD: 3 % (ref 0–7)
EPITH CASTS URNS QL MICRO: ABNORMAL /LPF
ERYTHROCYTE [DISTWIDTH] IN BLOOD BY AUTOMATED COUNT: 12.9 % (ref 11.5–14.5)
GLOBULIN SER CALC-MCNC: 3 G/DL (ref 2–4)
GLUCOSE SERPL-MCNC: 102 MG/DL (ref 65–100)
GLUCOSE UR STRIP.AUTO-MCNC: NEGATIVE MG/DL
HCT VFR BLD AUTO: 45.2 % (ref 36.6–50.3)
HGB BLD-MCNC: 14.8 G/DL (ref 12.1–17)
HGB UR QL STRIP: NEGATIVE
HYALINE CASTS URNS QL MICRO: ABNORMAL /LPF (ref 0–5)
IMM GRANULOCYTES # BLD AUTO: 0 K/UL (ref 0–0.04)
IMM GRANULOCYTES NFR BLD AUTO: 0 % (ref 0–0.5)
KETONES UR QL STRIP.AUTO: NEGATIVE MG/DL
LEUKOCYTE ESTERASE UR QL STRIP.AUTO: ABNORMAL
LYMPHOCYTES # BLD: 2 K/UL (ref 0.8–3.5)
LYMPHOCYTES NFR BLD: 33 % (ref 12–49)
MCH RBC QN AUTO: 30 PG (ref 26–34)
MCHC RBC AUTO-ENTMCNC: 32.7 G/DL (ref 30–36.5)
MCV RBC AUTO: 91.7 FL (ref 80–99)
MONOCYTES # BLD: 0.5 K/UL (ref 0–1)
MONOCYTES NFR BLD: 8 % (ref 5–13)
NEUTS SEG # BLD: 3.4 K/UL (ref 1.8–8)
NEUTS SEG NFR BLD: 55 % (ref 32–75)
NITRITE UR QL STRIP.AUTO: POSITIVE
NRBC # BLD: 0 K/UL (ref 0–0.01)
NRBC BLD-RTO: 0 PER 100 WBC
PH UR STRIP: 6.5 (ref 5–8)
PLATELET # BLD AUTO: 249 K/UL (ref 150–400)
PMV BLD AUTO: 10.5 FL (ref 8.9–12.9)
POTASSIUM SERPL-SCNC: 4.2 MMOL/L (ref 3.5–5.1)
PROT SERPL-MCNC: 6.8 G/DL (ref 6.4–8.2)
PROT UR STRIP-MCNC: NEGATIVE MG/DL
RBC # BLD AUTO: 4.93 M/UL (ref 4.1–5.7)
RBC #/AREA URNS HPF: ABNORMAL /HPF (ref 0–5)
SODIUM SERPL-SCNC: 140 MMOL/L (ref 136–145)
SP GR UR REFRACTOMETRY: 1.01 (ref 1–1.03)
URINE CULTURE IF INDICATED: ABNORMAL
UROBILINOGEN UR QL STRIP.AUTO: 0.2 EU/DL (ref 0.2–1)
WBC # BLD AUTO: 6 K/UL (ref 4.1–11.1)
WBC URNS QL MICRO: ABNORMAL /HPF (ref 0–4)

## 2024-03-29 PROCEDURE — 36415 COLL VENOUS BLD VENIPUNCTURE: CPT

## 2024-03-29 PROCEDURE — 87086 URINE CULTURE/COLONY COUNT: CPT

## 2024-03-29 PROCEDURE — 93005 ELECTROCARDIOGRAM TRACING: CPT | Performed by: STUDENT IN AN ORGANIZED HEALTH CARE EDUCATION/TRAINING PROGRAM

## 2024-03-29 PROCEDURE — 85025 COMPLETE CBC W/AUTO DIFF WBC: CPT

## 2024-03-29 PROCEDURE — 81001 URINALYSIS AUTO W/SCOPE: CPT

## 2024-03-29 PROCEDURE — 80053 COMPREHEN METABOLIC PANEL: CPT

## 2024-03-29 RX ORDER — ATORVASTATIN CALCIUM 20 MG/1
20 TABLET, FILM COATED ORAL
COMMUNITY

## 2024-03-29 RX ORDER — ACETAMINOPHEN 160 MG
4000 TABLET,DISINTEGRATING ORAL DAILY
COMMUNITY

## 2024-03-29 RX ORDER — FINASTERIDE 5 MG/1
5 TABLET, FILM COATED ORAL DAILY
COMMUNITY

## 2024-03-29 NOTE — PERIOP NOTE
[facility]  PREOPERATIVE INSTRUCTIONS    Surgery Date:   4-15-24    Your surgeon's office or [facility] staff will call you between 4 PM- 8 PM the day before surgery with your arrival time. If your surgery is on a Monday, you will receive a call the preceding Friday. If your surgeon;s office has given you arrival time, then go by that time.    Please report to [facility] Patient Access/Admitting on the 1st floor.  Bring your insurance card, photo identification, and any copayment ( if applicable).   If you are going home the same day of your surgery, you must have a responsible adult to drive you home. You need to have a responsible adult to stay with you the first 24 hours after surgery and you should not drive a car for 24 hours following your surgery.  If you are being admitted to the hospital, please leave personal belongings/luggage in your car until you have an assigned hospital room number.  Nothing to eat or drink after midnight the night before surgery. This includes no water, gum, mints, coffee, juice, etc.  Please note special instructions, if applicable, below for medications.  Do NOT drink alcohol or smoke 24 hours before surgery. STOP smoking for 14 days prior as it helps with breathing and healing after surgery.  Please wear comfortable clothes. Wear glasses instead of contacts. We ask that all money and jewelry and valuables to be left at home. Wear no makeup, particularly mascara the day of surgery.  All body piercings, rings, and jewelry need to be removed and left at home. Remove all nail polish except for clear. Please wear your hair loose or down. Please no pony-tails, buns, or any metal hair accessories. You may wear deodorant, unless having breast surgery. Do not shave any body area within 24 hours of your surgery.  Please follow all instructions to avoid any potential surgical cancellation.  Should your physical condition change, (i.e. fever, cold, flu, etc.) please notify your

## 2024-03-31 LAB
BACTERIA SPEC CULT: NORMAL
CC UR VC: NORMAL
SERVICE CMNT-IMP: NORMAL

## 2024-04-01 LAB
EKG ATRIAL RATE: 51 BPM
EKG DIAGNOSIS: NORMAL
EKG P AXIS: 18 DEGREES
EKG P-R INTERVAL: 180 MS
EKG Q-T INTERVAL: 426 MS
EKG QRS DURATION: 104 MS
EKG QTC CALCULATION (BAZETT): 392 MS
EKG R AXIS: 54 DEGREES
EKG T AXIS: 11 DEGREES
EKG VENTRICULAR RATE: 51 BPM

## 2024-04-01 PROCEDURE — 93010 ELECTROCARDIOGRAM REPORT: CPT | Performed by: SPECIALIST

## 2024-04-01 NOTE — PERIOP NOTE
ABNORMAL URINE CULTURE RESULTS FAXED TO PCP VIA [facility].  CALLED JEREMI SMITH`S OFFICE AND SPOKE TO ROGELIO STATED SHE WILL NOTIFY NURSE,

## 2024-04-01 NOTE — PERIOP NOTE
ABNORMAL URINE CULTURE, CALLED JEREMI SMITH`S OFFICE AND SPOKE TO ROGELIO, STATED SHE WILL NOTIFY NURSE.

## 2024-04-15 ENCOUNTER — HOSPITAL ENCOUNTER (OUTPATIENT)
Facility: HOSPITAL | Age: 69
Setting detail: OUTPATIENT SURGERY
Discharge: HOME | End: 2024-04-15
Attending: STUDENT IN AN ORGANIZED HEALTH CARE EDUCATION/TRAINING PROGRAM | Admitting: STUDENT IN AN ORGANIZED HEALTH CARE EDUCATION/TRAINING PROGRAM
Payer: MEDICARE

## 2024-04-15 ENCOUNTER — ANESTHESIA EVENT (OUTPATIENT)
Facility: HOSPITAL | Age: 69
End: 2024-04-15
Payer: MEDICARE

## 2024-04-15 ENCOUNTER — ANESTHESIA (OUTPATIENT)
Facility: HOSPITAL | Age: 69
End: 2024-04-15
Payer: MEDICARE

## 2024-04-15 VITALS
HEIGHT: 69 IN | SYSTOLIC BLOOD PRESSURE: 132 MMHG | BODY MASS INDEX: 27.49 KG/M2 | OXYGEN SATURATION: 99 % | HEART RATE: 57 BPM | TEMPERATURE: 98.3 F | WEIGHT: 185.63 LBS | DIASTOLIC BLOOD PRESSURE: 80 MMHG | RESPIRATION RATE: 14 BRPM

## 2024-04-15 DIAGNOSIS — N13.8 BENIGN PROSTATIC HYPERPLASIA WITH URINARY OBSTRUCTION: Primary | ICD-10-CM

## 2024-04-15 DIAGNOSIS — N40.1 BENIGN PROSTATIC HYPERPLASIA WITH URINARY OBSTRUCTION: Primary | ICD-10-CM

## 2024-04-15 PROCEDURE — 7100000000 HC PACU RECOVERY - FIRST 15 MIN: Performed by: STUDENT IN AN ORGANIZED HEALTH CARE EDUCATION/TRAINING PROGRAM

## 2024-04-15 PROCEDURE — 2709999900 HC NON-CHARGEABLE SUPPLY: Performed by: STUDENT IN AN ORGANIZED HEALTH CARE EDUCATION/TRAINING PROGRAM

## 2024-04-15 PROCEDURE — 6360000002 HC RX W HCPCS: Performed by: STUDENT IN AN ORGANIZED HEALTH CARE EDUCATION/TRAINING PROGRAM

## 2024-04-15 PROCEDURE — 7100000011 HC PHASE II RECOVERY - ADDTL 15 MIN: Performed by: STUDENT IN AN ORGANIZED HEALTH CARE EDUCATION/TRAINING PROGRAM

## 2024-04-15 PROCEDURE — 2500000003 HC RX 250 WO HCPCS: Performed by: ANESTHESIOLOGY

## 2024-04-15 PROCEDURE — 6370000000 HC RX 637 (ALT 250 FOR IP): Performed by: ANESTHESIOLOGY

## 2024-04-15 PROCEDURE — 82360 CALCULUS ASSAY QUANT: CPT

## 2024-04-15 PROCEDURE — 2500000003 HC RX 250 WO HCPCS: Performed by: NURSE ANESTHETIST, CERTIFIED REGISTERED

## 2024-04-15 PROCEDURE — 3700000001 HC ADD 15 MINUTES (ANESTHESIA): Performed by: STUDENT IN AN ORGANIZED HEALTH CARE EDUCATION/TRAINING PROGRAM

## 2024-04-15 PROCEDURE — 3600000004 HC SURGERY LEVEL 4 BASE: Performed by: STUDENT IN AN ORGANIZED HEALTH CARE EDUCATION/TRAINING PROGRAM

## 2024-04-15 PROCEDURE — 3700000000 HC ANESTHESIA ATTENDED CARE: Performed by: STUDENT IN AN ORGANIZED HEALTH CARE EDUCATION/TRAINING PROGRAM

## 2024-04-15 PROCEDURE — C1782 MORCELLATOR: HCPCS | Performed by: STUDENT IN AN ORGANIZED HEALTH CARE EDUCATION/TRAINING PROGRAM

## 2024-04-15 PROCEDURE — 2580000003 HC RX 258: Performed by: STUDENT IN AN ORGANIZED HEALTH CARE EDUCATION/TRAINING PROGRAM

## 2024-04-15 PROCEDURE — 6360000002 HC RX W HCPCS: Performed by: NURSE ANESTHETIST, CERTIFIED REGISTERED

## 2024-04-15 PROCEDURE — 7100000001 HC PACU RECOVERY - ADDTL 15 MIN: Performed by: STUDENT IN AN ORGANIZED HEALTH CARE EDUCATION/TRAINING PROGRAM

## 2024-04-15 PROCEDURE — 88305 TISSUE EXAM BY PATHOLOGIST: CPT

## 2024-04-15 PROCEDURE — 2580000003 HC RX 258: Performed by: ANESTHESIOLOGY

## 2024-04-15 PROCEDURE — 7100000010 HC PHASE II RECOVERY - FIRST 15 MIN: Performed by: STUDENT IN AN ORGANIZED HEALTH CARE EDUCATION/TRAINING PROGRAM

## 2024-04-15 PROCEDURE — 3600000014 HC SURGERY LEVEL 4 ADDTL 15MIN: Performed by: STUDENT IN AN ORGANIZED HEALTH CARE EDUCATION/TRAINING PROGRAM

## 2024-04-15 PROCEDURE — 88300 SURGICAL PATH GROSS: CPT

## 2024-04-15 RX ORDER — ROCURONIUM BROMIDE 10 MG/ML
INJECTION, SOLUTION INTRAVENOUS PRN
Status: DISCONTINUED | OUTPATIENT
Start: 2024-04-15 | End: 2024-04-15 | Stop reason: SDUPTHER

## 2024-04-15 RX ORDER — FENTANYL CITRATE 50 UG/ML
25 INJECTION, SOLUTION INTRAMUSCULAR; INTRAVENOUS EVERY 5 MIN PRN
Status: DISCONTINUED | OUTPATIENT
Start: 2024-04-15 | End: 2024-04-15 | Stop reason: HOSPADM

## 2024-04-15 RX ORDER — SODIUM CHLORIDE, SODIUM LACTATE, POTASSIUM CHLORIDE, CALCIUM CHLORIDE 600; 310; 30; 20 MG/100ML; MG/100ML; MG/100ML; MG/100ML
INJECTION, SOLUTION INTRAVENOUS CONTINUOUS
Status: DISCONTINUED | OUTPATIENT
Start: 2024-04-15 | End: 2024-04-15 | Stop reason: HOSPADM

## 2024-04-15 RX ORDER — TRAMADOL HYDROCHLORIDE 50 MG/1
50 TABLET ORAL EVERY 6 HOURS PRN
Qty: 12 TABLET | Refills: 0 | Status: SHIPPED | OUTPATIENT
Start: 2024-04-15 | End: 2024-04-18

## 2024-04-15 RX ORDER — ONDANSETRON 2 MG/ML
INJECTION INTRAMUSCULAR; INTRAVENOUS PRN
Status: DISCONTINUED | OUTPATIENT
Start: 2024-04-15 | End: 2024-04-15 | Stop reason: SDUPTHER

## 2024-04-15 RX ORDER — LIDOCAINE HYDROCHLORIDE 20 MG/ML
INJECTION, SOLUTION EPIDURAL; INFILTRATION; INTRACAUDAL; PERINEURAL PRN
Status: DISCONTINUED | OUTPATIENT
Start: 2024-04-15 | End: 2024-04-15 | Stop reason: SDUPTHER

## 2024-04-15 RX ORDER — OXYCODONE HYDROCHLORIDE 5 MG/1
5 TABLET ORAL
Status: DISCONTINUED | OUTPATIENT
Start: 2024-04-15 | End: 2024-04-15 | Stop reason: HOSPADM

## 2024-04-15 RX ORDER — LIDOCAINE HYDROCHLORIDE 10 MG/ML
1 INJECTION, SOLUTION EPIDURAL; INFILTRATION; INTRACAUDAL; PERINEURAL
Status: COMPLETED | OUTPATIENT
Start: 2024-04-15 | End: 2024-04-15

## 2024-04-15 RX ORDER — PROPOFOL 10 MG/ML
INJECTION, EMULSION INTRAVENOUS PRN
Status: DISCONTINUED | OUTPATIENT
Start: 2024-04-15 | End: 2024-04-15 | Stop reason: SDUPTHER

## 2024-04-15 RX ORDER — HYDRALAZINE HYDROCHLORIDE 20 MG/ML
10 INJECTION INTRAMUSCULAR; INTRAVENOUS
Status: DISCONTINUED | OUTPATIENT
Start: 2024-04-15 | End: 2024-04-15 | Stop reason: HOSPADM

## 2024-04-15 RX ORDER — HYDROMORPHONE HYDROCHLORIDE 1 MG/ML
0.5 INJECTION, SOLUTION INTRAMUSCULAR; INTRAVENOUS; SUBCUTANEOUS EVERY 5 MIN PRN
Status: DISCONTINUED | OUTPATIENT
Start: 2024-04-15 | End: 2024-04-15 | Stop reason: HOSPADM

## 2024-04-15 RX ORDER — DEXAMETHASONE SODIUM PHOSPHATE 4 MG/ML
INJECTION, SOLUTION INTRA-ARTICULAR; INTRALESIONAL; INTRAMUSCULAR; INTRAVENOUS; SOFT TISSUE PRN
Status: DISCONTINUED | OUTPATIENT
Start: 2024-04-15 | End: 2024-04-15 | Stop reason: SDUPTHER

## 2024-04-15 RX ORDER — ONDANSETRON 2 MG/ML
4 INJECTION INTRAMUSCULAR; INTRAVENOUS
Status: DISCONTINUED | OUTPATIENT
Start: 2024-04-15 | End: 2024-04-15 | Stop reason: HOSPADM

## 2024-04-15 RX ORDER — ACETAMINOPHEN 500 MG
1000 TABLET ORAL ONCE
Status: COMPLETED | OUTPATIENT
Start: 2024-04-15 | End: 2024-04-15

## 2024-04-15 RX ORDER — GLYCOPYRROLATE 0.2 MG/ML
INJECTION, SOLUTION INTRAMUSCULAR; INTRAVENOUS PRN
Status: DISCONTINUED | OUTPATIENT
Start: 2024-04-15 | End: 2024-04-15 | Stop reason: SDUPTHER

## 2024-04-15 RX ORDER — MIDAZOLAM HYDROCHLORIDE 2 MG/2ML
2 INJECTION, SOLUTION INTRAMUSCULAR; INTRAVENOUS
Status: DISCONTINUED | OUTPATIENT
Start: 2024-04-15 | End: 2024-04-15 | Stop reason: HOSPADM

## 2024-04-15 RX ORDER — SODIUM CHLORIDE 9 MG/ML
INJECTION, SOLUTION INTRAVENOUS PRN
Status: DISCONTINUED | OUTPATIENT
Start: 2024-04-15 | End: 2024-04-15 | Stop reason: HOSPADM

## 2024-04-15 RX ORDER — FENTANYL CITRATE 50 UG/ML
100 INJECTION, SOLUTION INTRAMUSCULAR; INTRAVENOUS
Status: DISCONTINUED | OUTPATIENT
Start: 2024-04-15 | End: 2024-04-15 | Stop reason: HOSPADM

## 2024-04-15 RX ORDER — SODIUM CHLORIDE 0.9 % (FLUSH) 0.9 %
5-40 SYRINGE (ML) INJECTION PRN
Status: DISCONTINUED | OUTPATIENT
Start: 2024-04-15 | End: 2024-04-15 | Stop reason: HOSPADM

## 2024-04-15 RX ORDER — KETOROLAC TROMETHAMINE 30 MG/ML
INJECTION, SOLUTION INTRAMUSCULAR; INTRAVENOUS PRN
Status: DISCONTINUED | OUTPATIENT
Start: 2024-04-15 | End: 2024-04-15 | Stop reason: SDUPTHER

## 2024-04-15 RX ORDER — NALOXONE HYDROCHLORIDE 0.4 MG/ML
INJECTION, SOLUTION INTRAMUSCULAR; INTRAVENOUS; SUBCUTANEOUS PRN
Status: DISCONTINUED | OUTPATIENT
Start: 2024-04-15 | End: 2024-04-15 | Stop reason: HOSPADM

## 2024-04-15 RX ORDER — MEPERIDINE HYDROCHLORIDE 25 MG/ML
INJECTION INTRAMUSCULAR; INTRAVENOUS; SUBCUTANEOUS PRN
Status: DISCONTINUED | OUTPATIENT
Start: 2024-04-15 | End: 2024-04-15 | Stop reason: SDUPTHER

## 2024-04-15 RX ORDER — SODIUM CHLORIDE 0.9 % (FLUSH) 0.9 %
5-40 SYRINGE (ML) INJECTION EVERY 12 HOURS SCHEDULED
Status: DISCONTINUED | OUTPATIENT
Start: 2024-04-15 | End: 2024-04-15 | Stop reason: HOSPADM

## 2024-04-15 RX ORDER — PHENAZOPYRIDINE HYDROCHLORIDE 100 MG/1
100 TABLET, FILM COATED ORAL 3 TIMES DAILY PRN
Qty: 12 TABLET | Refills: 0 | Status: SHIPPED | OUTPATIENT
Start: 2024-04-15 | End: 2025-04-15

## 2024-04-15 RX ORDER — MIDAZOLAM HYDROCHLORIDE 1 MG/ML
INJECTION INTRAMUSCULAR; INTRAVENOUS PRN
Status: DISCONTINUED | OUTPATIENT
Start: 2024-04-15 | End: 2024-04-15 | Stop reason: SDUPTHER

## 2024-04-15 RX ORDER — PHENYLEPHRINE HCL IN 0.9% NACL 0.4MG/10ML
SYRINGE (ML) INTRAVENOUS PRN
Status: DISCONTINUED | OUTPATIENT
Start: 2024-04-15 | End: 2024-04-15 | Stop reason: SDUPTHER

## 2024-04-15 RX ORDER — PROCHLORPERAZINE EDISYLATE 5 MG/ML
5 INJECTION INTRAMUSCULAR; INTRAVENOUS
Status: DISCONTINUED | OUTPATIENT
Start: 2024-04-15 | End: 2024-04-15 | Stop reason: HOSPADM

## 2024-04-15 RX ORDER — FENTANYL CITRATE 50 UG/ML
INJECTION, SOLUTION INTRAMUSCULAR; INTRAVENOUS PRN
Status: DISCONTINUED | OUTPATIENT
Start: 2024-04-15 | End: 2024-04-15 | Stop reason: SDUPTHER

## 2024-04-15 RX ORDER — SULFAMETHOXAZOLE AND TRIMETHOPRIM 400; 80 MG/1; MG/1
1 TABLET ORAL 2 TIMES DAILY
COMMUNITY

## 2024-04-15 RX ADMIN — MEPERIDINE HYDROCHLORIDE 25 MG: 25 INJECTION INTRAMUSCULAR; INTRAVENOUS; SUBCUTANEOUS at 15:17

## 2024-04-15 RX ADMIN — ROCURONIUM BROMIDE 15 MG: 50 INJECTION INTRAVENOUS at 14:47

## 2024-04-15 RX ADMIN — ACETAMINOPHEN 1000 MG: 500 TABLET ORAL at 12:22

## 2024-04-15 RX ADMIN — PROPOFOL 200 MG: 10 INJECTION, EMULSION INTRAVENOUS at 13:28

## 2024-04-15 RX ADMIN — KETOROLAC TROMETHAMINE 30 MG: 30 INJECTION, SOLUTION INTRAMUSCULAR; INTRAVENOUS at 15:14

## 2024-04-15 RX ADMIN — DEXAMETHASONE SODIUM PHOSPHATE 4 MG: 4 INJECTION INTRA-ARTICULAR; INTRALESIONAL; INTRAMUSCULAR; INTRAVENOUS; SOFT TISSUE at 13:35

## 2024-04-15 RX ADMIN — Medication 80 MCG: at 13:43

## 2024-04-15 RX ADMIN — SODIUM CHLORIDE, POTASSIUM CHLORIDE, SODIUM LACTATE AND CALCIUM CHLORIDE: 600; 310; 30; 20 INJECTION, SOLUTION INTRAVENOUS at 12:18

## 2024-04-15 RX ADMIN — Medication 80 MCG: at 13:34

## 2024-04-15 RX ADMIN — MIDAZOLAM 2 MG: 1 INJECTION INTRAMUSCULAR; INTRAVENOUS at 13:23

## 2024-04-15 RX ADMIN — GLYCOPYRROLATE 0.2 MG: 0.2 INJECTION, SOLUTION INTRAMUSCULAR; INTRAVENOUS at 13:44

## 2024-04-15 RX ADMIN — LIDOCAINE HYDROCHLORIDE 100 MG: 20 INJECTION, SOLUTION EPIDURAL; INFILTRATION; INTRACAUDAL; PERINEURAL at 13:28

## 2024-04-15 RX ADMIN — LIDOCAINE HYDROCHLORIDE 1 ML: 10 INJECTION, SOLUTION EPIDURAL; INFILTRATION; INTRACAUDAL; PERINEURAL at 12:17

## 2024-04-15 RX ADMIN — FENTANYL CITRATE 100 MCG: 50 INJECTION, SOLUTION INTRAMUSCULAR; INTRAVENOUS at 13:28

## 2024-04-15 RX ADMIN — PROPOFOL 50 MG: 10 INJECTION, EMULSION INTRAVENOUS at 14:33

## 2024-04-15 RX ADMIN — ONDANSETRON 4 MG: 2 INJECTION INTRAMUSCULAR; INTRAVENOUS at 15:12

## 2024-04-15 RX ADMIN — ROCURONIUM BROMIDE 10 MG: 50 INJECTION INTRAVENOUS at 13:28

## 2024-04-15 RX ADMIN — WATER 2000 MG: 1 INJECTION INTRAMUSCULAR; INTRAVENOUS; SUBCUTANEOUS at 13:37

## 2024-04-15 RX ADMIN — ROCURONIUM BROMIDE 20 MG: 50 INJECTION INTRAVENOUS at 13:39

## 2024-04-15 RX ADMIN — MIDAZOLAM 2 MG: 1 INJECTION INTRAMUSCULAR; INTRAVENOUS at 13:15

## 2024-04-15 RX ADMIN — SODIUM CHLORIDE, POTASSIUM CHLORIDE, SODIUM LACTATE AND CALCIUM CHLORIDE: 600; 310; 30; 20 INJECTION, SOLUTION INTRAVENOUS at 13:59

## 2024-04-15 RX ADMIN — SUGAMMADEX 200 MG: 100 INJECTION, SOLUTION INTRAVENOUS at 15:13

## 2024-04-15 RX ADMIN — ROCURONIUM BROMIDE 5 MG: 50 INJECTION INTRAVENOUS at 14:33

## 2024-04-15 ASSESSMENT — PAIN - FUNCTIONAL ASSESSMENT: PAIN_FUNCTIONAL_ASSESSMENT: 0-10

## 2024-04-15 NOTE — DISCHARGE INSTRUCTIONS
______________________________________________________________________    Anesthesia Discharge Instructions    After general anesthesia or intervenous sedation, for 24 hours or while taking prescription Narcotics:  Limit your activities  Do not drive or operate hazardous machinery  If you have not urinated within 8 hours after discharge, please contact your surgeon on call.  Do not make important personal or business decisions  Do not drink alcoholic beverages    Report the following to your surgeon:  Excessive pain, swelling, redness or odor of or around the surgical area  Temperature over 100.5 degrees  Nausea and vomiting lasting longer than 4 hours or if unable to take medication  Any signs of decreased circulation or nerve impairment to extremity:  Change in color, persistent numbness, tingling, coldness or increased pain.  Any questions

## 2024-04-15 NOTE — FLOWSHEET NOTE
04/15/24 1404   Family Communication    Relationship to Patient Partner    Phone Number Nazanin Macias [phone]   Family/Significant Other Update Called;Updated   Delivery Origin Nurse   Message Disposition Family present - message delivered   Update Given Yes   Family Communication   Family Update Message Procedure started;Surgeon working

## 2024-04-15 NOTE — H&P
HISTORY AND PHYSICAL             Date: 4/15/2024        Patient Name: Waqas Keith     YOB: 1955      Age:  68 y.o.    Chief Complaint   Urinary retention  History Obtained From   patient    History of Present Illness   68m with BPH and urinary retention presents for laser enucleation of prostate with morcellation    Last OV:  Waqas Keith is a 68 year old male who presents today for \"discuss outlet procedure\". He returns for follow-up. He regularly follows with Dr. Gómez for history of elevated PSA with prior negative biopsy 2012, 2013, 2/2024 (PSA 18.05).  He has also previously had MRI of the prostate with most recent imaging 12/2023 demonstrating 100 cc gland with no suspicious lesions.    He developed urinary retention following biopsy last month.  CT measured prostate 126 cc and catheter was placed for 1100 cc.  He has failed voiding trial x 2.  He takes tadalafil and tamsulosin.    He presents today to discuss surgical options for treatment of urinary retention.  He has been managing his catheter with a plug which is allowed him more mobility.  He has not seen any blood in the catheter for about a week.  He admits to significant emotional distress dealing with the catheter and has felt very depressed at times, but feels like this is improving.  He is tearful when discussing events over the last month.  He denies any prior history of retention.  Prior baseline LUTS were tolerable on tamsulosin and tadalafil.  He has a friend who had a laser vaporization of his prostate and had a good outcome from that.  Daughter's wedding is coming up at the end of June and he is concerned about any surgical recovery impacting his ability to prepare for that.  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  Recently recovering from a tricep injury sustained when moving furniture.    PAST MEDICAL HISTORY:    Allergies: No known allergies.  DENIES:

## 2024-04-15 NOTE — OP NOTE
DATE OF PROCEDURE: 4/15/2024    SURGEON: Vega Amor MD    ASSISTANT: none    PREOPERATIVE DIAGNOSES:   1.  Enlarged prostate with bladder outlet obstruction  2.  Urinary retention      POSTOPERATIVE DIAGNOSES:   Enlarged prostate with bladder outlet obstruction  Urinary retention  Bladder stones    PROCEDURES PERFORMED:   1.  Cystolitholapaxy  2. ProTouch Laser enucleation of prostate  3. Morcellation of prostatic tissue      PERIOPERATIVE ANTIBIOTICS: Ancef.  Completed p.o. course of Bactrim preoperatively.    ANAESTHESIA: general    INDICATIONS:This patient has a history of elevated PSA with recent negative biopsy 2/2024, enlarged prostate measuring approximate 126 cc by CT.  He developed urinary retention after his biopsy.  Failed multiple voiding trials.. After discussion of management options the patient elected to proceed with the procedures listed above.     PROCEDURE NARRATIVE: The patient signed consent for the operation prior to being brought back to the operating room. Upon arrival on the operating room, a time out was performed for the patient's safety and the correct patient, procedure, site and side were identified. The patient was placed in a supine position and anesthesia was administered.  The patient was placed in a dorsal lithotomy position. All pressure points were appropriately padded in order to prevent compartment syndrome and neuropathy. The patient was prepped and draped in the usual sterile fashion.     Cystourethroscopy was performed with a 26fr continuous flow sheath and 30 degree lens.  The penile urethra appeared normal.  Prostate was notable for severe bilobar enlargement with visual obstruction, 4.5 cm prostatic urethral length, and elevated bladder neck with small median lobe with intravesical protrusion.  Bladder mucosa was notable for moderate trabeculations.  Ureteral orifices were orthotopic effluxing clear urine, somewhat difficult to visualize due to the

## 2024-04-15 NOTE — ANESTHESIA PRE PROCEDURE
Department of Anesthesiology  Preprocedure Note       Name:  Waqas Keith   Age:  68 y.o.  :  1955                                          MRN:  [id number]         Date:  4/15/2024      Surgeon: Surgeon(s):  Vega Amor MD    Procedure: Procedure(s):  PROTOUCH LASER ENUCLEATION AND MORCELLATION OF THE PROSTATE    Medications prior to admission:   Prior to Admission medications    Medication Sig Start Date End Date Taking? Authorizing Provider   atorvastatin (LIPITOR) 20 MG tablet Take 1 tablet by mouth nightly    iWlma Tello MD   finasteride (PROSCAR) 5 MG tablet Take 1 tablet by mouth daily    Wilma Tello MD   vitamin D (VITAMIN D3) 50 MCG (2000) CAPS capsule Take 2 capsules by mouth daily    Wilma Tello MD   Multiple Vitamins-Minerals (ICAPS AREDS 2 PO) Take by mouth 2 times daily at 0800 and 1400    Wilma Tello MD       Current medications:    Current Facility-Administered Medications   Medication Dose Route Frequency Provider Last Rate Last Admin   • ceFAZolin (ANCEF) 2,000 mg in sterile water 20 mL IV syringe  2,000 mg IntraVENous Once Vega Amor MD       • lidocaine PF 1 % injection 1 mL  1 mL IntraDERmal Once PRN Yury Narvaez MD       • acetaminophen (TYLENOL) tablet 1,000 mg  1,000 mg Oral Once Yury Narvaez MD       • fentaNYL (SUBLIMAZE) injection 100 mcg  100 mcg IntraVENous Once PRN Yury Narvaez MD       • lactated ringers IV soln infusion   IntraVENous Continuous Yury Narvaez MD       • sodium chloride flush 0.9 % injection 5-40 mL  5-40 mL IntraVENous 2 times per day Yury Narvaez MD       • sodium chloride flush 0.9 % injection 5-40 mL  5-40 mL IntraVENous PRN Yury Narvaez MD       • 0.9 % sodium chloride infusion   IntraVENous PRN Yury Narvaez MD       • midazolam PF (VERSED) injection 2 mg  2 mg IntraVENous Once PRN Yury Narvaez MD           Allergies:  No Known Allergies    Problem

## 2024-04-15 NOTE — PROGRESS NOTES
Discharge instructions reviewed with patient and family using teachback. All questions have been answered. Prescriptions sent to [facility] pharmacy. Vital signs stable, pain appropriately managed. Patient wheeled off the unit with PACU staff.

## 2024-04-26 LAB
CARBONATE APATITE: 40 %
COLOR STONE: NORMAL
LABORATORY COMMENT REPORT: NORMAL
LABORATORY COMMENT REPORT: NORMAL
Lab: NORMAL
Lab: NORMAL
PHOTO: NORMAL
SIZE STONE: NORMAL MM
SPECIMEN SOURCE: NORMAL
STONE ANALYSIS-IMP: NORMAL
STONE COMPOSITION: NORMAL
TRI-PHOS MFR STONE: 60 %
WT STONE: 975 MG

## 2024-05-30 ENCOUNTER — HOSPITAL ENCOUNTER (OUTPATIENT)
Facility: HOSPITAL | Age: 69
Setting detail: OUTPATIENT SURGERY
Discharge: HOME | End: 2024-05-30
Attending: INTERNAL MEDICINE | Admitting: INTERNAL MEDICINE
Payer: MEDICARE

## 2024-05-30 ENCOUNTER — ANESTHESIA (OUTPATIENT)
Facility: HOSPITAL | Age: 69
End: 2024-05-30
Payer: MEDICARE

## 2024-05-30 ENCOUNTER — ANESTHESIA EVENT (OUTPATIENT)
Facility: HOSPITAL | Age: 69
End: 2024-05-30
Payer: MEDICARE

## 2024-05-30 VITALS
OXYGEN SATURATION: 99 % | WEIGHT: 185 LBS | HEART RATE: 57 BPM | SYSTOLIC BLOOD PRESSURE: 122 MMHG | RESPIRATION RATE: 13 BRPM | DIASTOLIC BLOOD PRESSURE: 78 MMHG | HEIGHT: 69 IN | TEMPERATURE: 97.6 F | BODY MASS INDEX: 27.4 KG/M2

## 2024-05-30 PROCEDURE — 88305 TISSUE EXAM BY PATHOLOGIST: CPT

## 2024-05-30 PROCEDURE — 2709999900 HC NON-CHARGEABLE SUPPLY: Performed by: INTERNAL MEDICINE

## 2024-05-30 PROCEDURE — 7100000011 HC PHASE II RECOVERY - ADDTL 15 MIN: Performed by: INTERNAL MEDICINE

## 2024-05-30 PROCEDURE — 3700000000 HC ANESTHESIA ATTENDED CARE: Performed by: INTERNAL MEDICINE

## 2024-05-30 PROCEDURE — 2580000003 HC RX 258: Performed by: INTERNAL MEDICINE

## 2024-05-30 PROCEDURE — 3700000001 HC ADD 15 MINUTES (ANESTHESIA): Performed by: INTERNAL MEDICINE

## 2024-05-30 PROCEDURE — 6370000000 HC RX 637 (ALT 250 FOR IP): Performed by: INTERNAL MEDICINE

## 2024-05-30 PROCEDURE — 6360000002 HC RX W HCPCS: Performed by: NURSE ANESTHETIST, CERTIFIED REGISTERED

## 2024-05-30 PROCEDURE — 3600007502: Performed by: INTERNAL MEDICINE

## 2024-05-30 PROCEDURE — 7100000010 HC PHASE II RECOVERY - FIRST 15 MIN: Performed by: INTERNAL MEDICINE

## 2024-05-30 PROCEDURE — 3600007512: Performed by: INTERNAL MEDICINE

## 2024-05-30 RX ORDER — SIMETHICONE 40MG/0.6ML
SUSPENSION, DROPS(FINAL DOSAGE FORM)(ML) ORAL PRN
Status: DISCONTINUED | OUTPATIENT
Start: 2024-05-30 | End: 2024-05-30 | Stop reason: ALTCHOICE

## 2024-05-30 RX ORDER — SODIUM CHLORIDE 9 MG/ML
25 INJECTION, SOLUTION INTRAVENOUS PRN
Status: DISCONTINUED | OUTPATIENT
Start: 2024-05-30 | End: 2024-05-30 | Stop reason: HOSPADM

## 2024-05-30 RX ORDER — SODIUM CHLORIDE 0.9 % (FLUSH) 0.9 %
5-40 SYRINGE (ML) INJECTION PRN
Status: DISCONTINUED | OUTPATIENT
Start: 2024-05-30 | End: 2024-05-30 | Stop reason: HOSPADM

## 2024-05-30 RX ORDER — SODIUM CHLORIDE 9 MG/ML
INJECTION, SOLUTION INTRAVENOUS CONTINUOUS
Status: DISCONTINUED | OUTPATIENT
Start: 2024-05-30 | End: 2024-05-30 | Stop reason: HOSPADM

## 2024-05-30 RX ORDER — SODIUM CHLORIDE 0.9 % (FLUSH) 0.9 %
5-40 SYRINGE (ML) INJECTION EVERY 12 HOURS SCHEDULED
Status: DISCONTINUED | OUTPATIENT
Start: 2024-05-30 | End: 2024-05-30 | Stop reason: HOSPADM

## 2024-05-30 RX ORDER — DOXYCYCLINE HYCLATE 100 MG/1
100 CAPSULE ORAL 2 TIMES DAILY
COMMUNITY
Start: 2024-05-01 | End: 2024-05-31

## 2024-05-30 RX ORDER — PHENYLEPHRINE HCL IN 0.9% NACL 0.4MG/10ML
SYRINGE (ML) INTRAVENOUS PRN
Status: DISCONTINUED | OUTPATIENT
Start: 2024-05-30 | End: 2024-05-30 | Stop reason: SDUPTHER

## 2024-05-30 RX ADMIN — PROPOFOL 25 MG: 10 INJECTION, EMULSION INTRAVENOUS at 08:53

## 2024-05-30 RX ADMIN — PROPOFOL 25 MG: 10 INJECTION, EMULSION INTRAVENOUS at 08:42

## 2024-05-30 RX ADMIN — PROPOFOL 25 MG: 10 INJECTION, EMULSION INTRAVENOUS at 08:44

## 2024-05-30 RX ADMIN — Medication 120 MCG: at 08:53

## 2024-05-30 RX ADMIN — PROPOFOL 25 MG: 10 INJECTION, EMULSION INTRAVENOUS at 08:43

## 2024-05-30 RX ADMIN — PROPOFOL 25 MG: 10 INJECTION, EMULSION INTRAVENOUS at 08:48

## 2024-05-30 RX ADMIN — PROPOFOL 25 MG: 10 INJECTION, EMULSION INTRAVENOUS at 08:47

## 2024-05-30 RX ADMIN — PROPOFOL 25 MG: 10 INJECTION, EMULSION INTRAVENOUS at 08:38

## 2024-05-30 RX ADMIN — PROPOFOL 25 MG: 10 INJECTION, EMULSION INTRAVENOUS at 08:46

## 2024-05-30 RX ADMIN — PROPOFOL 50 MG: 10 INJECTION, EMULSION INTRAVENOUS at 08:36

## 2024-05-30 RX ADMIN — SODIUM CHLORIDE: 9 INJECTION, SOLUTION INTRAVENOUS at 08:25

## 2024-05-30 RX ADMIN — PROPOFOL 25 MG: 10 INJECTION, EMULSION INTRAVENOUS at 08:37

## 2024-05-30 RX ADMIN — PROPOFOL 25 MG: 10 INJECTION, EMULSION INTRAVENOUS at 08:39

## 2024-05-30 RX ADMIN — PROPOFOL 25 MG: 10 INJECTION, EMULSION INTRAVENOUS at 08:51

## 2024-05-30 RX ADMIN — PROPOFOL 25 MG: 10 INJECTION, EMULSION INTRAVENOUS at 08:40

## 2024-05-30 RX ADMIN — PROPOFOL 25 MG: 10 INJECTION, EMULSION INTRAVENOUS at 08:58

## 2024-05-30 RX ADMIN — PROPOFOL 25 MG: 10 INJECTION, EMULSION INTRAVENOUS at 08:56

## 2024-05-30 ASSESSMENT — PAIN SCALES - GENERAL
PAINLEVEL_OUTOF10: 0

## 2024-05-30 NOTE — INTERVAL H&P NOTE
Pre-Endoscopy H&P Update  Chief complaint/HPI/ROS:  The indication for the procedure, the patient's history and the patient's current medications are reviewed prior to the procedure and that data is reported on the H&P to which this document is attached.  Any significant complaints with regard to organ systems will be noted, and if not mentioned then a review of systems is not contributory.  Past Medical History:   Diagnosis Date    Sleep apnea     USES CPAP      Past Surgical History:   Procedure Laterality Date    BLEPHAROPLASTY Bilateral 2024    CARPAL TUNNEL RELEASE Bilateral 2009    JOINT REPLACEMENT Bilateral 2017    PARTIAL KNEE REPLACEMENT    OTHER SURGICAL HISTORY Left 02/2024    REPAIR TRICEP TENDONS AND IN 2019    PROSTATE BIOPSY  02/2024    SHOULDER SURGERY Left 2016    REPAIR OF SEPARATION    THUMB ARTHROSCOPY Left 2016    LIGAMENT REPAIR    TOE SURGERY Right 2012    TURP N/A 4/15/2024    PROTOUCH LASER ENUCLEATION AND MORCELLATION OF THE PROSTATE performed by Vega Amor MD at [facility] MAIN OR    WRIST SURGERY Right 1973 1976     Social   Social History     Tobacco Use    Smoking status: Never    Smokeless tobacco: Never   Substance Use Topics    Alcohol use: Yes     Comment: OCCASIONALLY      Family History   Problem Relation Age of Onset    Arthritis Mother     No Known Problems Father     No Known Problems Brother     Anesth Problems Neg Hx       No Known Allergies   Prior to Admission Medications   Prescriptions Last Dose Informant Patient Reported? Taking?   Multiple Vitamins-Minerals (ICAPS AREDS 2 PO)   Yes No   Sig: Take by mouth 2 times daily at 0800 and 1400   atorvastatin (LIPITOR) 20 MG tablet   Yes No   Sig: Take 1 tablet by mouth nightly   phenazopyridine (PYRIDIUM) 100 MG tablet   No No   Sig: Take 1 tablet by mouth 3 times daily as needed for Pain   sulfamethoxazole-trimethoprim (BACTRIM;SEPTRA) 400-80 MG per tablet   Yes No   Sig: Take 1 tablet by mouth 2 times daily

## 2024-05-30 NOTE — DISCHARGE INSTRUCTIONS
[facility]  [facility]  DIRK Medeiros Jr, MD  [phone]      May 30, 2024    Waqas Keith  YOB: 1955    COLONOSCOPY DISCHARGE INSTRUCTIONS    If there is redness at IV site you should apply warm compress to area.  If redness or soreness persist contact Dr. Medeiros's office or your primary care doctor.    There may be a slight amount of blood passed from the rectum.  Gaseous discomfort may develop, but walking, belching will help relieve this.  You may not operate a vehicle for 12 hours  You may not operate machinery or dangerous appliances for rest of today  You may not drink alcoholic beverages for 12 hours  Avoid making any critical decisions for 24 hours    DIET:  You may resume your normal diet, but some patients find that heavy or large meals may lead to indigestion or vomiting.  I suggest a light meal as first food intake.    MEDICATIONS:  The use of some over-the-counter pain medication may lead to bleeding after colon biopsies or polyp removal.  Tylenol (also called acetaminophen) is safe to take even if you have had colonoscopy with polyp removal.  Based on the procedure you had today you may safely take aspirin or aspirin-like products for the next seven (7) days.  Remember that Tylenol (also called acetaminophen) is safe to take after colonoscopy even if you have had biopsies or polyps removed.    ACTIVITY:  You may resume your normal household activities, but it is recommended that you spend the remainder of the day resting -  avoid any strenuous activity.    CALL DR. MEDEIROS'S OFFICE IF:  Increasing pain, nausea, vomiting  Abdominal distension (swelling)  Significant new or increased bleeding (oral or rectal)  Fever/Chills  Chest pain/shortness of breath                       Additional instructions:   Impression:  Descending colon polyp x 1, removed  Sigmoid colon polyp x 1,

## 2024-05-30 NOTE — OP NOTE
[facility]  [facility]  DIRK Black Jr, MD  [phone]      May 30, 2024    Colonoscopy Procedure Note  Waqas Keith  :  1955  [place] Medical Record Number: [id number]    Indications:   Personal history of colon polyp  PCP:  Rajendra Hunter MD  Anesthesia/Sedation: See Anesthesia Record  Endoscopist:  Dr. DIRK Black Jr  Complications:  None  Estimated Blood Loss:  None    Surgical assistant: Thaulator: Artemio Tobar RN  Endoscopy Technician: Aquiles Aparicio none unless otherwise specified.     Permit:  The indications, risks, benefits and alternatives were reviewed with the patient or their decision maker who was provided an opportunity to ask questions and all questions were answered.  The specific risks of colonoscopy with conscious sedation were reviewed, including but not limited to anesthetic complication, bleeding, adverse drug reaction, missed lesion, infection, IV site reactions, and intestinal perforation which would lead to the need for surgical repair.  Alternatives to colonoscopy including radiographic imaging, observation without testing, or laboratory testing were reviewed including the limitations of those alternatives.  After considering the options and having all their questions answered, the patient or their decision maker provided both verbal and written consent to proceed.        Procedure in Detail:  After obtaining informed consent, positioning of the patient in the left lateral decubitus position, and conduction of a pre-procedure pause or \"time out\" the endoscope was introduced into the anus and advanced to the cecum, which was identified by the ileocecal valve and appendiceal orifice.  The quality of the colonic preparation was good.  A careful inspection was made as the colonoscope was withdrawn, findings and interventions are described below.    Findings:

## 2024-05-30 NOTE — PROGRESS NOTES

## 2024-05-30 NOTE — H&P
68 y.o. male presents for open access colonoscopy for surveillance given a personal history of colon polyps.  Additional H&P data will be attached on the day of procedure.    Mary Black Jr, MD

## 2024-05-30 NOTE — ANESTHESIA PRE PROCEDURE
Department of Anesthesiology  Preprocedure Note       Name:  Waqas Keith   Age:  68 y.o.  :  1955                                          MRN:  [id number]         Date:  2024      Surgeon: Surgeon(s):  Mary Black MD    Procedure: Procedure(s):  COLONOSCOPY    Medications prior to admission:   Prior to Admission medications    Medication Sig Start Date End Date Taking? Authorizing Provider   doxycycline hyclate (VIBRAMYCIN) 100 MG capsule Take 1 capsule by mouth 2 times daily 24 Yes Wilma Tello MD   sulfamethoxazole-trimethoprim (BACTRIM;SEPTRA) 400-80 MG per tablet Take 1 tablet by mouth 2 times daily    Wilma Tello MD   phenazopyridine (PYRIDIUM) 100 MG tablet Take 1 tablet by mouth 3 times daily as needed for Pain 4/15/24 4/15/25  Vega Amor MD   atorvastatin (LIPITOR) 20 MG tablet Take 1 tablet by mouth nightly    Wilma Tello MD   vitamin D (VITAMIN D3) 50 MCG (2000) CAPS capsule Take 2 capsules by mouth daily    Wilma Tello MD   Multiple Vitamins-Minerals (ICAPS AREDS 2 PO) Take by mouth 2 times daily at 0800 and 1400    Wilma Tello MD       Current medications:    Current Facility-Administered Medications   Medication Dose Route Frequency Provider Last Rate Last Admin   • 0.9 % sodium chloride infusion   IntraVENous Continuous Mary Black MD   New Bag at 24 0825   • sodium chloride flush 0.9 % injection 5-40 mL  5-40 mL IntraVENous 2 times per day Mary Blakc MD       • sodium chloride flush 0.9 % injection 5-40 mL  5-40 mL IntraVENous PRN Mary Black MD       • 0.9 % sodium chloride infusion  25 mL IntraVENous PRN Mary Black MD         Current Outpatient Medications   Medication Sig Dispense Refill   • doxycycline hyclate (VIBRAMYCIN) 100 MG capsule Take 1 capsule by mouth 2 times daily     • sulfamethoxazole-trimethoprim (BACTRIM;SEPTRA) 400-80 MG per tablet Take 1

## 2024-05-30 NOTE — ANESTHESIA POSTPROCEDURE EVALUATION
Department of Anesthesiology  Postprocedure Note    Patient: Waqas Keith  MRN: [id number]  YOB: 1955  Date of evaluation: 5/30/2024    Procedure Summary       Date: 05/30/24 Room / Location: [place] [id number] / [facility] ENDOSCOPY    Anesthesia Start: 0833 Anesthesia Stop: 0901    Procedure: COLONOSCOPY Diagnosis:       Special screening for malignant neoplasms, colon      (Special screening for malignant neoplasms, colon [Z12.11])    Surgeons: Mary Black MD Responsible Provider: Tenzin Dennis MD    Anesthesia Type: MAC ASA Status: 2            Anesthesia Type: MAC    Estefany Phase I: Estefany Score: 10    Estefany Phase II: Estefany Score: 10    Anesthesia Post Evaluation    Patient location during evaluation: bedside  Nausea & Vomiting: no nausea  Cardiovascular status: blood pressure returned to baseline  Respiratory status: acceptable  Hydration status: euvolemic    No notable events documented.

## 2024-11-01 ENCOUNTER — TRANSCRIBE ORDERS (OUTPATIENT)
Facility: HOSPITAL | Age: 69
End: 2024-11-01

## 2024-11-01 DIAGNOSIS — I71.20 THORACIC AORTIC ANEURYSM (TAA), UNSPECIFIED PART, UNSPECIFIED WHETHER RUPTURED (HCC): Primary | ICD-10-CM

## (undated) DEVICE — GOWN SURG 2XL 56.5 IN AAMI LEVEL 3 ORBIS

## (undated) DEVICE — (D)BILATERAL ANCILLIARY PK -- DISC BY MFR NO SUB

## (undated) DEVICE — CYSTO/BLADDER IRRIGATION SET, REGULATING CLAMP

## (undated) DEVICE — DISPOSABLE TOURNIQUET CUFF SINGLE BLADDER, DUAL PORT AND QUICK CONNECT CONNECTOR: Brand: COLOR CUFF

## (undated) DEVICE — SUTURE VCRL SZ 2 L54IN ABSRB UD L65MM TP-1 1/2 CIR J880T

## (undated) DEVICE — 3M™ IOBAN™ 2 ANTIMICROBIAL INCISE DRAPE 6651EZ: Brand: IOBAN™ 2

## (undated) DEVICE — SOLUTION IRRIG 3000ML 0.9% SOD CHL FLX CONT 0797208] ICU MEDICAL INC]

## (undated) DEVICE — STERILE POLYISOPRENE POWDER-FREE SURGICAL GLOVES WITH EMOLLIENT COATING: Brand: PROTEXIS

## (undated) DEVICE — SYRINGE,TOOMEY,IRRIGATION,70CC,STERILE: Brand: MEDLINE

## (undated) DEVICE — 3M™ IOBAN™ 2 ANTIMICROBIAL INCISE DRAPE 6648EZ: Brand: IOBAN™ 2

## (undated) DEVICE — BLADE SAW W073XL276IN THK0031IN CUT THK0036IN REPL SAG

## (undated) DEVICE — TELFA NON-ADHERENT ABSORBENT DRESSING: Brand: TELFA

## (undated) DEVICE — 3000CC GUARDIAN II: Brand: GUARDIAN

## (undated) DEVICE — GLOVE ORANGE PI 8   MSG9080

## (undated) DEVICE — SNARE VASC L240CM LOOP W10MM SHTH DIA2.4MM RND STIFF CLD

## (undated) DEVICE — BOWL BNE CEM MIX SPAT CURET SMARTMIX CTS

## (undated) DEVICE — T4 HOOD

## (undated) DEVICE — CYSTO-SMH: Brand: MEDLINE INDUSTRIES, INC.

## (undated) DEVICE — TRAY CATH 16F URIN MTR LTX -- CONVERT TO ITEM 363111

## (undated) DEVICE — BLADE SAW W0.49XL3.15IN THK0.047IN CUT THK0.047IN REPL SAG

## (undated) DEVICE — MIXING SYS CEM BNE VAC -- QUICKVAC 15/BX

## (undated) DEVICE — BLADE RMFG SAG LG 19.1X70X1MM --

## (undated) DEVICE — CATHETER FOL 3 W 22 FR 30 CC URETH SPEC COUVELAIRE BARDX IC

## (undated) DEVICE — Device

## (undated) DEVICE — DEVON™ KNEE AND BODY STRAP 60" X 3" (1.5 M X 7.6 CM): Brand: DEVON

## (undated) DEVICE — Z INACTIVE NO USAGE TURNOVER KIT RM CLEANOP

## (undated) DEVICE — SPONGE GZ W4XL4IN COT 12 PLY TYP VII WVN C FLD DSGN STERILE

## (undated) DEVICE — PADDING CAST SPEC 6INX4YD COT --

## (undated) DEVICE — SUTURE MCRYL SZ 3-0 L27IN ABSRB UD L19MM PS-2 3/8 CIR PRIM Y427H

## (undated) DEVICE — TUBE FOR SUCTION  PVC, PACK=10 PCS, STERILE, FOR SINGLE USE: Brand: PIRANHA

## (undated) DEVICE — SUTURE VCRL SZ 2-0 L36IN ABSRB UD L40MM CT 1/2 CIR J957H

## (undated) DEVICE — SUTURE VCRL SZ 0 L36IN ABSRB VLT L40MM CT 1/2 CIR J358H

## (undated) DEVICE — HANDPIECE SET WITH BONE CLEANING TIP AND SUCTION TUBE: Brand: INTERPULSE

## (undated) DEVICE — PADDING CST 6IN STERILE --

## (undated) DEVICE — SCRUB DRY SURG EZ SCRUB BRUSH PREOPERATIVE GRN

## (undated) DEVICE — STERILE POLYISOPRENE POWDER-FREE SURGICAL GLOVES: Brand: PROTEXIS

## (undated) DEVICE — TRAP SURG QUAD PARABOLA SLOT DSGN SFTY SCRN TRAPEASE

## (undated) DEVICE — REM POLYHESIVE ADULT PATIENT RETURN ELECTRODE: Brand: VALLEYLAB

## (undated) DEVICE — PREP SKN PREVAIL 40ML APPL --

## (undated) DEVICE — GOWN,PLEAT,SPECIALTY,XL,STRL: Brand: MEDLINE

## (undated) DEVICE — CUSTOM CAST PD STR

## (undated) DEVICE — HOOK LOCK LATEX FREE ELASTIC BANDAGE D/L 6INX10YD

## (undated) DEVICE — SMALL RIORDAN RASP 32.0MM

## (undated) DEVICE — 3M™ RANGER™ FLUID WARMING IRRIGATION SET, 24750, 10/CASE: Brand: 3M™ RANGER™

## (undated) DEVICE — SKIN CLOS DERMABND PRINEO 60CM -- DERMABOUND PRINEO

## (undated) DEVICE — ROTATIONS-MORCELLATOR Ø 4.8MM WL 335MM  FOR MORCESCOPE, FOR MORCELLATION FOLLOWING LASER PROSTATE ENUCLEATION, STERILE: Brand: PIRANHA